# Patient Record
Sex: MALE | Race: WHITE | NOT HISPANIC OR LATINO | Employment: OTHER | URBAN - METROPOLITAN AREA
[De-identification: names, ages, dates, MRNs, and addresses within clinical notes are randomized per-mention and may not be internally consistent; named-entity substitution may affect disease eponyms.]

---

## 2017-03-11 ENCOUNTER — ALLSCRIPTS OFFICE VISIT (OUTPATIENT)
Dept: OTHER | Facility: OTHER | Age: 47
End: 2017-03-11

## 2017-03-22 ENCOUNTER — ALLSCRIPTS OFFICE VISIT (OUTPATIENT)
Dept: OTHER | Facility: OTHER | Age: 47
End: 2017-03-22

## 2017-03-22 DIAGNOSIS — M25.552 PAIN IN LEFT HIP: ICD-10-CM

## 2017-09-26 ENCOUNTER — ALLSCRIPTS OFFICE VISIT (OUTPATIENT)
Dept: OTHER | Facility: OTHER | Age: 47
End: 2017-09-26

## 2017-11-02 ENCOUNTER — GENERIC CONVERSION - ENCOUNTER (OUTPATIENT)
Dept: FAMILY MEDICINE CLINIC | Facility: CLINIC | Age: 47
End: 2017-11-02

## 2017-11-30 ENCOUNTER — GENERIC CONVERSION - ENCOUNTER (OUTPATIENT)
Dept: OTHER | Facility: OTHER | Age: 47
End: 2017-11-30

## 2018-01-10 NOTE — PROGRESS NOTES
Assessment    1  Lateral epicondylitis of right elbow (726 32) (M77 11)    Plan  Anxiety    · LORazepam 0 5 MG Oral Tablet; TAKE 1 TABLET EVERY 6 TO 8 HOURS AS  NEEDED  Lateral epicondylitis of right elbow    · PredniSONE 20 MG Oral Tablet; 1 bid x 5 days , 1 qd x 5 days    Chief Complaint  RIGHT ELBO PAIN--CANT --PAIN INTO FOREARM      History of Present Illness  HPI: see CC      Active Problems    1  Acute otitis media (382 9) (H66 90)   2  Ankle pain (719 47) (M25 579)   3  Anxiety (300 00) (F41 9)   4  Diverticulitis of colon (562 11) (K57 32)   5  Foot pain, unspecified laterality   6  Mixed hyperlipidemia (272 2) (E78 2)   7  Need for influenza vaccination (V04 81) (Z23)   8  Warts (078 10) (B07 9)    Past Medical History  Active Problems And Past Medical History Reviewed: The active problems and past medical history were reviewed and updated today  Family History  Father    1  Family history of diabetes mellitus (V18 0) (Z83 3)   2  Family history of hypertension (V17 49) (Z82 49)    Social History    · Current every day smoker (305 1) (F17 200)    Current Meds   1  LORazepam 0 5 MG Oral Tablet; TAKE 1 TABLET EVERY 6 TO 8 HOURS AS NEEDED; Therapy: [id number] to (Evaluate:14Mar2017); Last Rx:14Nov2016; Status: ACTIVE -   Retrospective By Protocol Authorization Ordered   2  PARoxetine HCl - 20 MG Oral Tablet; TAKE ONE (1) TABLET(S) DAILY; Therapy: [id number] to (Ge Cool)  Requested for: 44NKU7812; Last   Rx:[id number] Ordered    Allergies    1  No Known Drug Allergies    Vitals   Recorded: [id number] 08:46AM   Heart Rate 72   Systolic 161   Diastolic 74   Height 5 ft 3 in   Weight 155 lb    BMI Calculated 27 46   BSA Calculated 1 74   O2 Saturation 96     Physical Exam    Constitutional   General appearance: No acute distress, well appearing and well nourished  Pulmonary   Auscultation of lungs: Clear to auscultation, equal breath sounds bilaterally, no wheezes, no rales, no rhonci  Cardiovascular   Auscultation of heart: Normal rate and rhythm, normal S1 and S2, without murmurs  Musculoskeletal   Inspection/palpation of joints, bones, and muscles: Abnormal   R lat epicondylitis          Signatures   Electronically signed by : Hui Zuleta MD; Mar 11 2017  8:58AM EST                       (Author)

## 2018-01-11 NOTE — RESULT NOTES
Verified Results  (1) LIPID PANEL, FASTING 16Apr2016 09:11AM Radha Jaeger     Test Name Result Flag Reference   CHOLESTEROL, TOTAL 220 mg/dL H 125-200   HDL CHOLESTEROL 53 mg/dL  > OR = 40   TRIGLICERIDES 350 mg/dL H <150   LDL-CHOLESTEROL 126 mg/dL (calc)  <130   Desirable range <100 mg/dL for patients with CHD or  diabetes and <70 mg/dL for diabetic patients with  known heart disease  CHOL/HDLC RATIO 4 2 (calc)  < OR = 5 0   NON HDL CHOLESTEROL 167 mg/dL (calc) H    Target for non-HDL cholesterol is 30 mg/dL higher than   LDL cholesterol target  (1) COMPREHENSIVE METABOLIC PANEL [id number] 12:65CK Radha Jaeger   REPORT COMMENT:  FASTING:YES     Test Name Result Flag Reference   GLUCOSE 94 mg/dL  65-99   Fasting reference interval   UREA NITROGEN (BUN) 19 mg/dL  7-25   CREATININE 0 94 mg/dL  0 60-1 35   eGFR NON-AFR   AMERICAN 97 mL/min/1 73m2  > OR = 60   eGFR AFRICAN AMERICAN 112 mL/min/1 73m2  > OR = 60   BUN/CREATININE RATIO   8-79   NOT APPLICABLE (calc)   SODIUM 140 mmol/L  135-146   POTASSIUM 4 7 mmol/L  3 5-5 3   CHLORIDE 107 mmol/L     CARBON DIOXIDE 23 mmol/L  19-30   CALCIUM 9 5 mg/dL  8 6-10 3   PROTEIN, TOTAL 6 5 g/dL  6 1-8 1   ALBUMIN 4 3 g/dL  3 6-5 1   GLOBULIN 2 2 g/dL (calc)  1 9-3 7   ALBUMIN/GLOBULIN RATIO 2 0 (calc)  1 0-2 5   BILIRUBIN, TOTAL 0 4 mg/dL  0 2-1 2   ALKALINE PHOSPHATASE 48 U/L     AST 28 U/L  10-40   ALT 36 U/L  9-46

## 2018-01-12 VITALS
DIASTOLIC BLOOD PRESSURE: 82 MMHG | BODY MASS INDEX: 27.46 KG/M2 | HEIGHT: 63 IN | SYSTOLIC BLOOD PRESSURE: 126 MMHG | WEIGHT: 155 LBS

## 2018-01-13 VITALS
OXYGEN SATURATION: 95 % | SYSTOLIC BLOOD PRESSURE: 160 MMHG | WEIGHT: 154 LBS | TEMPERATURE: 98 F | HEIGHT: 63 IN | BODY MASS INDEX: 27.29 KG/M2 | HEART RATE: 122 BPM | DIASTOLIC BLOOD PRESSURE: 100 MMHG | RESPIRATION RATE: 18 BRPM

## 2018-01-14 VITALS
SYSTOLIC BLOOD PRESSURE: 120 MMHG | OXYGEN SATURATION: 96 % | HEIGHT: 63 IN | DIASTOLIC BLOOD PRESSURE: 74 MMHG | WEIGHT: 155 LBS | HEART RATE: 72 BPM | BODY MASS INDEX: 27.46 KG/M2

## 2018-03-20 DIAGNOSIS — F41.9 ANXIETY: Primary | ICD-10-CM

## 2018-03-20 RX ORDER — LORAZEPAM 0.5 MG/1
0.5 TABLET ORAL EVERY 6 HOURS PRN
Qty: 30 TABLET | Refills: 0 | Status: SHIPPED | OUTPATIENT
Start: 2018-03-20 | End: 2018-04-14 | Stop reason: SDUPTHER

## 2018-03-20 NOTE — TELEPHONE ENCOUNTER
Please approve    Pt due labs and hm - called and spoke to pt, he will call back to set up ov and labs later on this evening

## 2018-04-03 DIAGNOSIS — Z13.220 NEED FOR LIPID SCREENING: ICD-10-CM

## 2018-04-03 DIAGNOSIS — F41.9 ANXIETY: Primary | ICD-10-CM

## 2018-04-14 DIAGNOSIS — F41.9 ANXIETY: ICD-10-CM

## 2018-04-14 LAB
ALBUMIN SERPL-MCNC: 4.5 G/DL (ref 3.5–5.5)
ALBUMIN/GLOB SERPL: 1.8 {RATIO} (ref 1.2–2.2)
ALP SERPL-CCNC: 63 IU/L (ref 39–117)
ALT SERPL-CCNC: 41 IU/L (ref 0–44)
AST SERPL-CCNC: 32 IU/L (ref 0–40)
BILIRUB SERPL-MCNC: 0.3 MG/DL (ref 0–1.2)
BUN SERPL-MCNC: 17 MG/DL (ref 6–24)
BUN/CREAT SERPL: 18 (ref 9–20)
CALCIUM SERPL-MCNC: 9.3 MG/DL (ref 8.7–10.2)
CHLORIDE SERPL-SCNC: 101 MMOL/L (ref 96–106)
CHOLEST SERPL-MCNC: 207 MG/DL (ref 100–199)
CO2 SERPL-SCNC: 22 MMOL/L (ref 18–29)
CREAT SERPL-MCNC: 0.95 MG/DL (ref 0.76–1.27)
GLOBULIN SER-MCNC: 2.5 G/DL (ref 1.5–4.5)
GLUCOSE SERPL-MCNC: 94 MG/DL (ref 65–99)
HDLC SERPL-MCNC: 44 MG/DL
LDLC SERPL CALC-MCNC: 104 MG/DL (ref 0–99)
LDLC/HDLC SERPL: 2.4 RATIO (ref 0–3.6)
POTASSIUM SERPL-SCNC: 4.7 MMOL/L (ref 3.5–5.2)
PROT SERPL-MCNC: 7 G/DL (ref 6–8.5)
SL AMB EGFR AFRICAN AMERICAN: 109 ML/MIN/1.73
SL AMB EGFR NON AFRICAN AMERICAN: 94 ML/MIN/1.73
SL AMB VLDL CHOLESTEROL CALC: 59 MG/DL (ref 5–40)
SODIUM SERPL-SCNC: 139 MMOL/L (ref 134–144)
TRIGL SERPL-MCNC: 296 MG/DL (ref 0–149)

## 2018-04-14 RX ORDER — LORAZEPAM 0.5 MG/1
0.5 TABLET ORAL EVERY 6 HOURS PRN
Qty: 30 TABLET | Refills: 0 | Status: SHIPPED | OUTPATIENT
Start: 2018-04-14 | End: 2018-04-19 | Stop reason: SDUPTHER

## 2018-04-16 ENCOUNTER — TELEPHONE (OUTPATIENT)
Dept: FAMILY MEDICINE CLINIC | Facility: CLINIC | Age: 48
End: 2018-04-16

## 2018-04-19 ENCOUNTER — OFFICE VISIT (OUTPATIENT)
Dept: FAMILY MEDICINE CLINIC | Facility: CLINIC | Age: 48
End: 2018-04-19
Payer: COMMERCIAL

## 2018-04-19 VITALS
OXYGEN SATURATION: 98 % | BODY MASS INDEX: 27.29 KG/M2 | HEIGHT: 63 IN | SYSTOLIC BLOOD PRESSURE: 138 MMHG | HEART RATE: 88 BPM | DIASTOLIC BLOOD PRESSURE: 82 MMHG | WEIGHT: 154 LBS

## 2018-04-19 DIAGNOSIS — Z00.00 WELLNESS EXAMINATION: Primary | ICD-10-CM

## 2018-04-19 DIAGNOSIS — F41.9 ANXIETY: ICD-10-CM

## 2018-04-19 DIAGNOSIS — E78.01 FAMILIAL HYPERCHOLESTEROLEMIA: ICD-10-CM

## 2018-04-19 PROCEDURE — 3008F BODY MASS INDEX DOCD: CPT | Performed by: FAMILY MEDICINE

## 2018-04-19 PROCEDURE — 99213 OFFICE O/P EST LOW 20 MIN: CPT | Performed by: FAMILY MEDICINE

## 2018-04-19 PROCEDURE — 99396 PREV VISIT EST AGE 40-64: CPT | Performed by: FAMILY MEDICINE

## 2018-04-19 RX ORDER — LORAZEPAM 0.5 MG/1
0.5 TABLET ORAL EVERY 8 HOURS PRN
Qty: 60 TABLET | Refills: 5 | Status: SHIPPED | OUTPATIENT
Start: 2018-04-19 | End: 2018-11-12 | Stop reason: SDUPTHER

## 2018-04-19 RX ORDER — PAROXETINE HYDROCHLORIDE 20 MG/1
1 TABLET, FILM COATED ORAL DAILY
COMMUNITY
Start: 2016-02-08 | End: 2019-05-10

## 2018-04-19 RX ORDER — AZITHROMYCIN 250 MG/1
250 TABLET, FILM COATED ORAL EVERY 24 HOURS
Qty: 6 TABLET | Refills: 0 | Status: SHIPPED | OUTPATIENT
Start: 2018-04-19 | End: 2018-04-24

## 2018-04-19 NOTE — PROGRESS NOTES
HPI:  Sada Plasencia is a 50 y o  male here for his yearly health maintenance exam    There is no problem list on file for this patient  Past Medical History:   Diagnosis Date    Allergic     Anxiety     Depression        1  Advanced Directive: n     2  Durable Power of  for Healthcare: n     3  Social History:           Drug and alcohol History: n                  4  General Health: good              Immunizations up to date: y                 Lifestyle:                           Healthy Jocelyn Cardenas                          Alcohol Use:y                          Tobacco Use:n                          Regular exercise:n                          Weight concerns:n                               5  Over the past 2 weeks, how often have you been bothered by the following:              Little interest or pleasure in doing things:n              Felling down, depressed or hopeless:n       Current Outpatient Prescriptions   Medication Sig Dispense Refill    LORazepam (ATIVAN) 0 5 mg tablet Take 1 tablet (0 5 mg total) by mouth every 8 (eight) hours as needed for anxiety 60 tablet 5    PARoxetine (PAXIL) 20 mg tablet Take 1 tablet by mouth daily       No current facility-administered medications for this visit  No Known Allergies  Immunization History   Administered Date(s) Administered    Influenza Quadrivalent Preservative Free 3 years and older IM 10/21/2015    Tdap 11/07/2011       Patient Care Team:  Vonnie Ross MD as PCP - General      Physical Exam :  Physical Exam     Reviewed Updated [facility] Prior Wellness Visits:   Last Health Maintenance visit information was reviewed, patient interviewed , no change since last HM visit yes  Last HM visit information was reviewed, patient interviewed and updates made to the record today yes    Assessment and Plan:  1  Wellness examination     2  Anxiety  LORazepam (ATIVAN) 0 5 mg tablet   3   Familial hypercholesterolemia         Health Maintenance Due   Topic Date Due    HIV SCREENING  1970    PNEUMOCOCCAL POLYSACCHARIDE VACCINE AGE 2-64 HIGH RISK  01/21/1972    Depression Screening PHQ-9  01/21/1982    [facility]    INFLUENZA VACCINE  09/01/2017

## 2018-04-19 NOTE — PROGRESS NOTES
[address] [facility]        NAME: Martell Vuong is a 50 y o  male  : 1970    MRN: [id number]  DATE: 2018  TIME: 8:37 AM    Assessment and Plan   Wellness examination [Z00 00]  1  Wellness examination     2  Anxiety  LORazepam (ATIVAN) 0 5 mg tablet   3  Familial hypercholesterolemia           Patient Instructions     Patient Instructions   Diet disc for high trigs          Chief Complaint     Chief Complaint   Patient presents with    MM     review labs/medications    Physical Exam     HM due         History of Present Illness       Stable anxiety        Review of Systems   Review of Systems   Constitutional: Negative for appetite change, chills, diaphoresis and fever  HENT: Negative for ear pain, rhinorrhea, sinus pressure and sore throat  Eyes: Negative for discharge, redness and itching  Respiratory: Negative for cough, shortness of breath and wheezing  Cardiovascular: Negative for chest pain and palpitations  Rapid or slow heart rate   Gastrointestinal: Negative for abdominal pain, diarrhea, nausea and vomiting           Current Medications       Current Outpatient Prescriptions:     LORazepam (ATIVAN) 0 5 mg tablet, Take 1 tablet (0 5 mg total) by mouth every 8 (eight) hours as needed for anxiety, Disp: 60 tablet, Rfl: 5    PARoxetine (PAXIL) 20 mg tablet, Take 1 tablet by mouth daily, Disp: , Rfl:     Current Allergies     Allergies as of 2018    (No Known Allergies)            The following portions of the patient's history were reviewed and updated as appropriate: allergies, current medications, past family history, past medical history, past social history, past surgical history and problem list      Past Medical History:   Diagnosis Date    Allergic     Anxiety     Depression        Past Surgical History:   Procedure Laterality Date    FOOT CAPSULE RELEASE W/ PERCUTANEOUS HEEL CORD LENGTHENING, TIBIAL TENDON TRANSFER      HAND SURGERY      HERNIA REPAIR         Family History   Problem Relation Age of Onset    Diabetes Father     Hypertension Father          Medications have been verified  Objective   /82   Pulse 88   Ht 5' 3" (1 6 m)   Wt 69 9 kg (154 lb)   SpO2 98%   BMI 27 28 kg/m²        Physical Exam     Physical Exam   Constitutional: He appears well-developed and well-nourished  No distress  HENT:   Right Ear: Tympanic membrane, external ear and ear canal normal  Tympanic membrane is not injected  Left Ear: Tympanic membrane, external ear and ear canal normal  Tympanic membrane is not injected  Nose: Nose normal    Mouth/Throat: Uvula is midline, oropharynx is clear and moist and mucous membranes are normal    Eyes: Conjunctivae are normal  Pupils are equal, round, and reactive to light  Neck: Normal range of motion  Neck supple  No thyromegaly present  Cardiovascular: Normal rate, regular rhythm and normal heart sounds  No murmur heard  Pulmonary/Chest: Effort normal and breath sounds normal  No respiratory distress  He has no wheezes  Lymphadenopathy:     He has no cervical adenopathy  Skin: He is not diaphoretic

## 2018-11-12 DIAGNOSIS — F41.9 ANXIETY: ICD-10-CM

## 2018-11-13 RX ORDER — LORAZEPAM 0.5 MG/1
0.5 TABLET ORAL EVERY 8 HOURS PRN
Qty: 60 TABLET | Refills: 2 | Status: SHIPPED | OUTPATIENT
Start: 2018-11-13 | End: 2019-02-07 | Stop reason: SDUPTHER

## 2018-11-27 ENCOUNTER — TELEPHONE (OUTPATIENT)
Dept: FAMILY MEDICINE CLINIC | Facility: CLINIC | Age: 48
End: 2018-11-27

## 2018-11-27 NOTE — TELEPHONE ENCOUNTER
Curryx Kaialainarosemarie   [id number]  appt 11/29/18  khpe  DX--R31 1  A63 0  R35 0  Z71 3        Referral #: [id number] Effective: 11/27/2018 Expires: 02/24/2019

## 2019-01-07 ENCOUNTER — OFFICE VISIT (OUTPATIENT)
Dept: FAMILY MEDICINE CLINIC | Facility: CLINIC | Age: 49
End: 2019-01-07
Payer: COMMERCIAL

## 2019-01-07 VITALS
HEIGHT: 64 IN | DIASTOLIC BLOOD PRESSURE: 74 MMHG | BODY MASS INDEX: 25.61 KG/M2 | WEIGHT: 150 LBS | OXYGEN SATURATION: 98 % | HEART RATE: 110 BPM | SYSTOLIC BLOOD PRESSURE: 118 MMHG

## 2019-01-07 DIAGNOSIS — S71.111D LACERATION OF RIGHT THIGH, SUBSEQUENT ENCOUNTER: ICD-10-CM

## 2019-01-07 DIAGNOSIS — Z48.02 VISIT FOR SUTURE REMOVAL: ICD-10-CM

## 2019-01-07 DIAGNOSIS — J20.1 ACUTE BRONCHITIS DUE TO HAEMOPHILUS INFLUENZAE: Primary | ICD-10-CM

## 2019-01-07 PROCEDURE — 99214 OFFICE O/P EST MOD 30 MIN: CPT | Performed by: FAMILY MEDICINE

## 2019-01-07 PROCEDURE — 3008F BODY MASS INDEX DOCD: CPT | Performed by: FAMILY MEDICINE

## 2019-01-07 RX ORDER — AZITHROMYCIN 250 MG/1
TABLET, FILM COATED ORAL
Qty: 6 TABLET | Refills: 0 | Status: SHIPPED | OUTPATIENT
Start: 2019-01-07 | End: 2019-01-11

## 2019-01-07 RX ORDER — ALBUTEROL SULFATE 90 UG/1
2 AEROSOL, METERED RESPIRATORY (INHALATION) EVERY 6 HOURS PRN
Qty: 8.5 G | Refills: 0 | Status: SHIPPED | OUTPATIENT
Start: 2019-01-07 | End: 2020-03-03 | Stop reason: ALTCHOICE

## 2019-01-07 NOTE — PATIENT INSTRUCTIONS
Covered Z-Rylan and given a albuterol inhaler 2 puffs every 4-6 hours help with the wheeziness  You can also use over-the-counter Robitussin DM to help with the cough  Tylenol Advil as needed for fevers or chills  If you're not improving in 3-4 days call the office, may wish to order an x-ray at that time
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
## 2019-01-14 ENCOUNTER — TELEPHONE (OUTPATIENT)
Dept: FAMILY MEDICINE CLINIC | Facility: CLINIC | Age: 49
End: 2019-01-14

## 2019-01-14 DIAGNOSIS — J20.9 ACUTE BRONCHITIS, UNSPECIFIED ORGANISM: Primary | ICD-10-CM

## 2019-01-14 RX ORDER — PREDNISONE 20 MG/1
TABLET ORAL
Qty: 15 TABLET | Refills: 0 | Status: SHIPPED | OUTPATIENT
Start: 2019-01-14 | End: 2019-05-10

## 2019-01-14 RX ORDER — CEPHALEXIN 500 MG/1
500 CAPSULE ORAL EVERY 8 HOURS SCHEDULED
Qty: 30 CAPSULE | Refills: 0 | Status: SHIPPED | OUTPATIENT
Start: 2019-01-14 | End: 2019-01-24

## 2019-01-14 NOTE — TELEPHONE ENCOUNTER
Pt seen last week- c/o not feeling any better- chest congestion, cough- not able to clear the congestion  Finished zpak  Was told to call back if not better      GIANT QT    No allergies

## 2019-01-14 NOTE — TELEPHONE ENCOUNTER
A okay to Rx cephalexin 500 mg 3 times daily #30  Also prednisone 20 mg twice daily x5 then daily x5 days  Appointment if no better after that

## 2019-02-07 DIAGNOSIS — F41.9 ANXIETY: ICD-10-CM

## 2019-02-07 RX ORDER — LORAZEPAM 0.5 MG/1
0.5 TABLET ORAL EVERY 8 HOURS PRN
Qty: 60 TABLET | Refills: 1 | Status: SHIPPED | OUTPATIENT
Start: 2019-02-07 | End: 2019-04-02 | Stop reason: SDUPTHER

## 2019-04-02 DIAGNOSIS — F41.9 ANXIETY: ICD-10-CM

## 2019-04-03 DIAGNOSIS — E78.2 MIXED HYPERLIPIDEMIA: Primary | ICD-10-CM

## 2019-04-03 RX ORDER — LORAZEPAM 0.5 MG/1
0.5 TABLET ORAL EVERY 8 HOURS PRN
Qty: 60 TABLET | Refills: 0 | Status: SHIPPED | OUTPATIENT
Start: 2019-04-03 | End: 2019-04-29 | Stop reason: SDUPTHER

## 2019-04-17 ENCOUNTER — TELEPHONE (OUTPATIENT)
Dept: FAMILY MEDICINE CLINIC | Facility: CLINIC | Age: 49
End: 2019-04-17

## 2019-04-17 LAB
ALBUMIN SERPL-MCNC: 4.3 G/DL (ref 3.5–5.5)
ALBUMIN/GLOB SERPL: 1.9 {RATIO} (ref 1.2–2.2)
ALP SERPL-CCNC: 57 IU/L (ref 39–117)
ALT SERPL-CCNC: 39 IU/L (ref 0–44)
AST SERPL-CCNC: 28 IU/L (ref 0–40)
BILIRUB SERPL-MCNC: 0.2 MG/DL (ref 0–1.2)
BUN SERPL-MCNC: 17 MG/DL (ref 6–24)
BUN/CREAT SERPL: 19 (ref 9–20)
CALCIUM SERPL-MCNC: 9.5 MG/DL (ref 8.7–10.2)
CHLORIDE SERPL-SCNC: 104 MMOL/L (ref 96–106)
CHOLEST SERPL-MCNC: 226 MG/DL (ref 100–199)
CHOLEST/HDLC SERPL: 4.8 RATIO (ref 0–5)
CO2 SERPL-SCNC: 22 MMOL/L (ref 20–29)
CREAT SERPL-MCNC: 0.89 MG/DL (ref 0.76–1.27)
GLOBULIN SER-MCNC: 2.3 G/DL (ref 1.5–4.5)
GLUCOSE SERPL-MCNC: 97 MG/DL (ref 65–99)
HDLC SERPL-MCNC: 47 MG/DL
LDLC SERPL CALC-MCNC: 128 MG/DL (ref 0–99)
LDLC SERPL DIRECT ASSAY-MCNC: 124 MG/DL (ref 0–99)
POTASSIUM SERPL-SCNC: 4.4 MMOL/L (ref 3.5–5.2)
PROT SERPL-MCNC: 6.6 G/DL (ref 6–8.5)
SL AMB EGFR AFRICAN AMERICAN: 116 ML/MIN/1.73
SL AMB EGFR NON AFRICAN AMERICAN: 100 ML/MIN/1.73
SL AMB VLDL CHOLESTEROL CALC: 51 MG/DL (ref 5–40)
SODIUM SERPL-SCNC: 140 MMOL/L (ref 134–144)
TRIGL SERPL-MCNC: 255 MG/DL (ref 0–149)

## 2019-04-29 DIAGNOSIS — F41.9 ANXIETY: ICD-10-CM

## 2019-04-30 RX ORDER — LORAZEPAM 0.5 MG/1
0.5 TABLET ORAL EVERY 8 HOURS PRN
Qty: 60 TABLET | Refills: 0 | Status: SHIPPED | OUTPATIENT
Start: 2019-04-30 | End: 2019-05-10

## 2019-05-10 ENCOUNTER — OFFICE VISIT (OUTPATIENT)
Dept: FAMILY MEDICINE CLINIC | Facility: CLINIC | Age: 49
End: 2019-05-10
Payer: COMMERCIAL

## 2019-05-10 VITALS
HEART RATE: 66 BPM | HEIGHT: 64 IN | WEIGHT: 160 LBS | BODY MASS INDEX: 27.31 KG/M2 | SYSTOLIC BLOOD PRESSURE: 148 MMHG | DIASTOLIC BLOOD PRESSURE: 108 MMHG | OXYGEN SATURATION: 98 %

## 2019-05-10 DIAGNOSIS — F41.9 ANXIETY: ICD-10-CM

## 2019-05-10 DIAGNOSIS — Z00.00 WELLNESS EXAMINATION: Primary | ICD-10-CM

## 2019-05-10 DIAGNOSIS — E78.01 FAMILIAL HYPERCHOLESTEROLEMIA: ICD-10-CM

## 2019-05-10 PROCEDURE — 99396 PREV VISIT EST AGE 40-64: CPT | Performed by: FAMILY MEDICINE

## 2019-05-10 PROCEDURE — 3008F BODY MASS INDEX DOCD: CPT | Performed by: FAMILY MEDICINE

## 2019-05-10 PROCEDURE — 99214 OFFICE O/P EST MOD 30 MIN: CPT | Performed by: FAMILY MEDICINE

## 2019-05-10 RX ORDER — ATORVASTATIN CALCIUM 10 MG/1
10 TABLET, FILM COATED ORAL DAILY
Qty: 90 TABLET | Refills: 3 | Status: SHIPPED | OUTPATIENT
Start: 2019-05-10 | End: 2020-03-03 | Stop reason: ALTCHOICE

## 2019-05-10 RX ORDER — LORAZEPAM 1 MG/1
1 TABLET ORAL EVERY 8 HOURS PRN
Qty: 90 TABLET | Refills: 5 | Status: SHIPPED | OUTPATIENT
Start: 2019-05-10 | End: 2019-12-21 | Stop reason: SDUPTHER

## 2019-08-21 ENCOUNTER — OFFICE VISIT (OUTPATIENT)
Dept: FAMILY MEDICINE CLINIC | Facility: CLINIC | Age: 49
End: 2019-08-21
Payer: COMMERCIAL

## 2019-08-21 VITALS
BODY MASS INDEX: 24.75 KG/M2 | OXYGEN SATURATION: 98 % | HEIGHT: 64 IN | HEART RATE: 93 BPM | WEIGHT: 145 LBS | SYSTOLIC BLOOD PRESSURE: 134 MMHG | TEMPERATURE: 98.7 F | DIASTOLIC BLOOD PRESSURE: 88 MMHG

## 2019-08-21 DIAGNOSIS — F41.9 ANXIETY: Primary | ICD-10-CM

## 2019-08-21 PROCEDURE — 99213 OFFICE O/P EST LOW 20 MIN: CPT | Performed by: FAMILY MEDICINE

## 2019-08-21 PROCEDURE — 3008F BODY MASS INDEX DOCD: CPT | Performed by: FAMILY MEDICINE

## 2019-08-21 RX ORDER — BUPROPION HYDROCHLORIDE 150 MG/1
150 TABLET, EXTENDED RELEASE ORAL 2 TIMES DAILY
Qty: 60 TABLET | Refills: 3 | Status: SHIPPED | OUTPATIENT
Start: 2019-08-21 | End: 2019-12-23 | Stop reason: SDUPTHER

## 2019-08-21 NOTE — PROGRESS NOTES
[facility]        NAME: Robert Blanton is a 52 y o  male  : 1970    MRN: [id number]  DATE: 2019  TIME: 5:26 PM    Assessment and Plan   Anxiety [F41 9]  1  Anxiety           Patient Instructions     Patient Instructions   Trial wellbutrin---? lexapro in future          Chief Complaint     Chief Complaint   Patient presents with    Change in Bowel Habits     going up to 12 times a day          History of Present Illness       Diarrhea--started shortly after starting zoloft      Review of Systems   Review of Systems   Constitutional: Negative for fatigue, fever and unexpected weight change  HENT: Negative for congestion, sinus pain and sore throat  Eyes: Negative for visual disturbance  Respiratory: Negative for shortness of breath and wheezing  Cardiovascular: Negative for chest pain and palpitations  Gastrointestinal: Positive for diarrhea  Negative for abdominal pain, nausea and vomiting  Musculoskeletal: Negative  Negative for arthralgias and myalgias  Neurological: Negative for syncope, weakness and numbness  Psychiatric/Behavioral: Negative  Negative for confusion, dysphoric mood and suicidal ideas           Current Medications       Current Outpatient Medications:     albuterol (PROAIR HFA) 90 mcg/act inhaler, Inhale 2 puffs every 6 (six) hours as needed for wheezing, Disp: 8 5 g, Rfl: 0    atorvastatin (LIPITOR) 10 mg tablet, Take 1 tablet (10 mg total) by mouth daily, Disp: 90 tablet, Rfl: 3    LORazepam (ATIVAN) 1 mg tablet, Take 1 tablet (1 mg total) by mouth every 8 (eight) hours as needed for anxiety, Disp: 90 tablet, Rfl: 5    Current Allergies     Allergies as of 2019    (No Known Allergies)            The following portions of the patient's history were reviewed and updated as appropriate: allergies, current medications, past family history, past medical history, past social history, past surgical history and problem list  Past Medical History:   Diagnosis Date    Allergic     Anxiety     Depression        Past Surgical History:   Procedure Laterality Date    FOOT CAPSULE RELEASE W/ PERCUTANEOUS HEEL CORD LENGTHENING, TIBIAL TENDON TRANSFER      HAND SURGERY      HERNIA REPAIR         Family History   Problem Relation Age of Onset    Diabetes Father     Hypertension Father          Medications have been verified  Objective   /88   Pulse 93   Temp 98 7 °F (37 1 °C)   Ht 5' 4" (1 626 m)   Wt 65 8 kg (145 lb)   SpO2 98%   BMI 24 89 kg/m²        Physical Exam     Physical Exam   Constitutional: He is oriented to person, place, and time  Vital signs are normal  He appears well-developed and well-nourished  HENT:   Right Ear: Ear canal normal  Tympanic membrane is not injected  Left Ear: Ear canal normal  Tympanic membrane is not injected  Nose: Nose normal    Mouth/Throat: Oropharynx is clear and moist    Eyes: Pupils are equal, round, and reactive to light  Conjunctivae and EOM are normal  Right eye exhibits no discharge  Left eye exhibits no discharge  Neck: Normal range of motion  Neck supple  No thyromegaly present  Cardiovascular: Normal rate, regular rhythm and normal heart sounds  No murmur heard  Pulmonary/Chest: Effort normal and breath sounds normal  No respiratory distress  He has no wheezes  Abdominal: Soft  Bowel sounds are normal  He exhibits no distension  There is no tenderness  Musculoskeletal: Normal range of motion  Lymphadenopathy:     He has no cervical adenopathy  Neurological: He is alert and oriented to person, place, and time  He has normal strength and normal reflexes  He is not disoriented  No sensory deficit  Gait normal    Skin: Skin is warm and dry  Psychiatric: He has a normal mood and affect   His speech is normal and behavior is normal  Judgment and thought content normal  Cognition and memory are normal

## 2019-09-05 ENCOUNTER — OFFICE VISIT (OUTPATIENT)
Dept: FAMILY MEDICINE CLINIC | Facility: CLINIC | Age: 49
End: 2019-09-05
Payer: COMMERCIAL

## 2019-09-05 VITALS
OXYGEN SATURATION: 98 % | HEART RATE: 90 BPM | DIASTOLIC BLOOD PRESSURE: 86 MMHG | SYSTOLIC BLOOD PRESSURE: 136 MMHG | HEIGHT: 64 IN | BODY MASS INDEX: 24.75 KG/M2 | WEIGHT: 145 LBS

## 2019-09-05 DIAGNOSIS — R10.32 LEFT LOWER QUADRANT PAIN: Primary | ICD-10-CM

## 2019-09-05 DIAGNOSIS — K57.92 DIVERTICULITIS: ICD-10-CM

## 2019-09-05 PROCEDURE — 99214 OFFICE O/P EST MOD 30 MIN: CPT | Performed by: FAMILY MEDICINE

## 2019-09-05 PROCEDURE — 3008F BODY MASS INDEX DOCD: CPT | Performed by: FAMILY MEDICINE

## 2019-09-05 RX ORDER — AMOXICILLIN AND CLAVULANATE POTASSIUM 875; 125 MG/1; MG/1
1 TABLET, FILM COATED ORAL EVERY 12 HOURS SCHEDULED
Qty: 20 TABLET | Refills: 0 | Status: SHIPPED | OUTPATIENT
Start: 2019-09-05 | End: 2019-09-15

## 2019-09-05 NOTE — PROGRESS NOTES
[facility]        NAME: Sheila Tavera is a 52 y o  male  : 1970    MRN: [id number]  DATE: 2019  TIME: 8:33 AM    Assessment and Plan   Left lower quadrant pain [R10 32]  1  Left lower quadrant pain           Patient Instructions     There are no Patient Instructions on file for this visit  Chief Complaint     Chief Complaint   Patient presents with    Abdominal Pain     lower         History of Present Illness       C/o LLQ pain for 2 days--no resp to OTC meds--mod severity      Review of Systems   Review of Systems   Constitutional: Negative for fever  Gastrointestinal: Positive for abdominal pain and nausea           Current Medications       Current Outpatient Medications:     albuterol (PROAIR HFA) 90 mcg/act inhaler, Inhale 2 puffs every 6 (six) hours as needed for wheezing, Disp: 8 5 g, Rfl: 0    atorvastatin (LIPITOR) 10 mg tablet, Take 1 tablet (10 mg total) by mouth daily, Disp: 90 tablet, Rfl: 3    buPROPion (WELLBUTRIN SR) 150 mg 12 hr tablet, Take 1 tablet (150 mg total) by mouth 2 (two) times a day, Disp: 60 tablet, Rfl: 3    LORazepam (ATIVAN) 1 mg tablet, Take 1 tablet (1 mg total) by mouth every 8 (eight) hours as needed for anxiety, Disp: 90 tablet, Rfl: 5    Current Allergies     Allergies as of 2019    (No Known Allergies)            The following portions of the patient's history were reviewed and updated as appropriate: allergies, current medications, past family history, past medical history, past social history, past surgical history and problem list      Past Medical History:   Diagnosis Date    Allergic     Anxiety     Depression        Past Surgical History:   Procedure Laterality Date    FOOT CAPSULE RELEASE W/ PERCUTANEOUS HEEL CORD LENGTHENING, TIBIAL TENDON TRANSFER      HAND SURGERY      HERNIA REPAIR         Family History   Problem Relation Age of Onset    Diabetes Father     Hypertension Father Medications have been verified  Objective   /86   Pulse 90   Ht 5' 4" (1 626 m)   Wt 65 8 kg (145 lb)   SpO2 98%   BMI 24 89 kg/m²        Physical Exam     Physical Exam   Constitutional: He is oriented to person, place, and time  Vital signs are normal  He appears well-developed and well-nourished  HENT:   Right Ear: Ear canal normal  Tympanic membrane is not injected  Left Ear: Ear canal normal  Tympanic membrane is not injected  Nose: Nose normal    Mouth/Throat: Oropharynx is clear and moist    Eyes: Pupils are equal, round, and reactive to light  Conjunctivae and EOM are normal  Right eye exhibits no discharge  Left eye exhibits no discharge  Neck: Normal range of motion  Neck supple  No thyromegaly present  Cardiovascular: Normal rate, regular rhythm and normal heart sounds  No murmur heard  Pulmonary/Chest: Effort normal and breath sounds normal  No respiratory distress  He has no wheezes  Abdominal: Soft  Bowel sounds are normal  He exhibits no distension  There is tenderness in the left lower quadrant  Musculoskeletal: Normal range of motion  Lymphadenopathy:     He has no cervical adenopathy  Neurological: He is alert and oriented to person, place, and time  He has normal strength and normal reflexes  He is not disoriented  No sensory deficit  Gait normal    Skin: Skin is warm and dry  Psychiatric: He has a normal mood and affect   His speech is normal and behavior is normal  Judgment and thought content normal  Cognition and memory are normal    tender LLQ to light palpation

## 2019-12-21 DIAGNOSIS — F41.9 ANXIETY: ICD-10-CM

## 2019-12-22 RX ORDER — LORAZEPAM 1 MG/1
1 TABLET ORAL EVERY 8 HOURS PRN
Qty: 90 TABLET | Refills: 4 | Status: SHIPPED | OUTPATIENT
Start: 2019-12-22 | End: 2020-05-22 | Stop reason: SDUPTHER

## 2019-12-23 DIAGNOSIS — F41.9 ANXIETY: ICD-10-CM

## 2019-12-23 RX ORDER — BUPROPION HYDROCHLORIDE 150 MG/1
TABLET, EXTENDED RELEASE ORAL
Qty: 60 TABLET | Refills: 3 | Status: SHIPPED | OUTPATIENT
Start: 2019-12-23 | End: 2020-05-22 | Stop reason: SDUPTHER

## 2020-02-06 ENCOUNTER — OFFICE VISIT (OUTPATIENT)
Dept: FAMILY MEDICINE CLINIC | Facility: CLINIC | Age: 50
End: 2020-02-06
Payer: COMMERCIAL

## 2020-02-06 VITALS
DIASTOLIC BLOOD PRESSURE: 88 MMHG | SYSTOLIC BLOOD PRESSURE: 136 MMHG | WEIGHT: 151 LBS | OXYGEN SATURATION: 98 % | HEIGHT: 64 IN | HEART RATE: 86 BPM | BODY MASS INDEX: 25.78 KG/M2

## 2020-02-06 DIAGNOSIS — Z12.12 ENCOUNTER FOR SCREENING FOR MALIGNANT NEOPLASM OF RECTUM: ICD-10-CM

## 2020-02-06 DIAGNOSIS — E78.01 FAMILIAL HYPERCHOLESTEROLEMIA: ICD-10-CM

## 2020-02-06 DIAGNOSIS — F10.20 ALCOHOLIC (HCC): Primary | ICD-10-CM

## 2020-02-06 DIAGNOSIS — Z12.11 ENCOUNTER FOR SCREENING FOR MALIGNANT NEOPLASM OF COLON: Primary | ICD-10-CM

## 2020-02-06 DIAGNOSIS — Z13.9 SCREENING FOR CONDITION: ICD-10-CM

## 2020-02-06 PROCEDURE — 99214 OFFICE O/P EST MOD 30 MIN: CPT | Performed by: FAMILY MEDICINE

## 2020-02-06 PROCEDURE — 3008F BODY MASS INDEX DOCD: CPT | Performed by: FAMILY MEDICINE

## 2020-02-06 NOTE — PROGRESS NOTES
[facility] Medical        NAME: Alfred Manzanares is a 48 y o  male  : 1970    MRN: [id number]  DATE: 2020  TIME: 10:14 AM    Assessment and Plan   Alcoholic ([facility] [address] ) [R34 05]  1  Alcoholic (HCC)  Gamma GT    Gamma GT   2  Familial hypercholesterolemia  Comprehensive metabolic panel    Lipid Panel with Direct LDL reflex    Gamma GT    Comprehensive metabolic panel    Lipid Panel with Direct LDL reflex    Gamma GT   3  Screening for condition  PSA Total (Reflex To Free)    PSA Total (Reflex To Free)         Patient Instructions     Patient Instructions   Pt good candidate for out-pt alcohol program          Chief Complaint     Chief Complaint   Patient presents with    Alcohol Problem     Outpt  rehab         History of Present Illness       Pt wants to be evaluated for treatment for alcoholism--daily intake 6-15 beers daily      Review of Systems   Review of Systems   Constitutional: Negative for fatigue, fever and unexpected weight change  HENT: Negative for congestion, sinus pain and sore throat  Eyes: Negative for visual disturbance  Respiratory: Negative for shortness of breath and wheezing  Cardiovascular: Negative for chest pain and palpitations  Gastrointestinal: Negative for abdominal pain, nausea and vomiting  Musculoskeletal: Negative  Negative for arthralgias and myalgias  Neurological: Negative for syncope, weakness and numbness  Psychiatric/Behavioral: Negative  Negative for confusion, dysphoric mood and suicidal ideas           Current Medications       Current Outpatient Medications:     buPROPion (WELLBUTRIN SR) 150 mg 12 hr tablet, TAKE ONE TABLET BY MOUTH TWICE A DAY, Disp: 60 tablet, Rfl: 3    LORazepam (ATIVAN) 1 mg tablet, Take 1 tablet (1 mg total) by mouth every 8 (eight) hours as needed for anxiety, Disp: 90 tablet, Rfl: 4    albuterol (PROAIR HFA) 90 mcg/act inhaler, Inhale 2 puffs every 6 (six) hours as needed for wheezing (Patient not taking: Reported on 2/6/2020), Disp: 8 5 g, Rfl: 0    atorvastatin (LIPITOR) 10 mg tablet, Take 1 tablet (10 mg total) by mouth daily (Patient not taking: Reported on 2/6/2020), Disp: 90 tablet, Rfl: 3    Current Allergies     Allergies as of 02/06/2020    (No Known Allergies)            The following portions of the patient's history were reviewed and updated as appropriate: allergies, current medications, past family history, past medical history, past social history, past surgical history and problem list      Past Medical History:   Diagnosis Date    Allergic     Anxiety     Depression        Past Surgical History:   Procedure Laterality Date    FOOT CAPSULE RELEASE W/ PERCUTANEOUS HEEL CORD LENGTHENING, TIBIAL TENDON TRANSFER      HAND SURGERY      HERNIA REPAIR         Family History   Problem Relation Age of Onset    Diabetes Father     Hypertension Father          Medications have been verified  Objective   /88   Pulse 86   Ht 5' 4" (1 626 m)   Wt 68 5 kg (151 lb)   SpO2 98%   BMI 25 92 kg/m²        Physical Exam     Physical Exam   Constitutional: He is oriented to person, place, and time  Vital signs are normal  He appears well-developed and well-nourished  HENT:   Right Ear: Ear canal normal  Tympanic membrane is not injected  Left Ear: Ear canal normal  Tympanic membrane is not injected  Nose: Nose normal    Mouth/Throat: Oropharynx is clear and moist    Eyes: Pupils are equal, round, and reactive to light  Conjunctivae and EOM are normal  Right eye exhibits no discharge  Left eye exhibits no discharge  Neck: Normal range of motion  Neck supple  No thyromegaly present  Cardiovascular: Normal rate, regular rhythm and normal heart sounds  No murmur heard  Pulmonary/Chest: Effort normal and breath sounds normal  No respiratory distress  He has no wheezes  Abdominal: Soft  Bowel sounds are normal  He exhibits no distension  There is no tenderness     Musculoskeletal: Normal range of motion  Lymphadenopathy:     He has no cervical adenopathy  Neurological: He is alert and oriented to person, place, and time  He has normal strength and normal reflexes  He is not disoriented  No sensory deficit  Gait normal    Skin: Skin is warm and dry  Psychiatric: He has a normal mood and affect  His speech is normal and behavior is normal  Judgment and thought content normal  Cognition and memory are normal      BMI Counseling: Body mass index is 25 92 kg/m²  The BMI is above normal  Nutrition recommendations include reducing portion sizes

## 2020-02-23 LAB
ALBUMIN SERPL-MCNC: 4.3 G/DL (ref 4–5)
ALBUMIN/GLOB SERPL: 1.7 {RATIO} (ref 1.2–2.2)
ALP SERPL-CCNC: 57 IU/L (ref 39–117)
ALT SERPL-CCNC: 50 IU/L (ref 0–44)
AST SERPL-CCNC: 31 IU/L (ref 0–40)
BILIRUB SERPL-MCNC: 0.3 MG/DL (ref 0–1.2)
BUN SERPL-MCNC: 15 MG/DL (ref 6–24)
BUN/CREAT SERPL: 14 (ref 9–20)
CALCIUM SERPL-MCNC: 9.3 MG/DL (ref 8.7–10.2)
CHLORIDE SERPL-SCNC: 103 MMOL/L (ref 96–106)
CHOLEST SERPL-MCNC: 245 MG/DL (ref 100–199)
CO2 SERPL-SCNC: 19 MMOL/L (ref 20–29)
CREAT SERPL-MCNC: 1.06 MG/DL (ref 0.76–1.27)
GGT SERPL-CCNC: 67 IU/L (ref 0–65)
GLOBULIN SER-MCNC: 2.6 G/DL (ref 1.5–4.5)
GLUCOSE SERPL-MCNC: 102 MG/DL (ref 65–99)
HDLC SERPL-MCNC: 37 MG/DL
LDLC SERPL CALC-MCNC: 139 MG/DL (ref 0–99)
LDLC/HDLC SERPL: 3.8 RATIO (ref 0–3.6)
POTASSIUM SERPL-SCNC: 4.4 MMOL/L (ref 3.5–5.2)
PROT SERPL-MCNC: 6.9 G/DL (ref 6–8.5)
PSA SERPL-MCNC: 1.4 NG/ML (ref 0–4)
SL AMB EGFR AFRICAN AMERICAN: 94 ML/MIN/1.73
SL AMB EGFR NON AFRICAN AMERICAN: 81 ML/MIN/1.73
SL AMB REFLEX CRITERIA: NORMAL
SL AMB VLDL CHOLESTEROL CALC: 69 MG/DL (ref 5–40)
SODIUM SERPL-SCNC: 139 MMOL/L (ref 134–144)
TRIGL SERPL-MCNC: 347 MG/DL (ref 0–149)

## 2020-02-27 ENCOUNTER — TELEPHONE (OUTPATIENT)
Dept: FAMILY MEDICINE CLINIC | Facility: CLINIC | Age: 50
End: 2020-02-27

## 2020-02-27 NOTE — TELEPHONE ENCOUNTER
----- Message from Willetta Spatz, MD sent at 2/24/2020  6:32 AM EST -----  MM/LABS 12MOS---LFTs sl elevated---stop alcohol

## 2020-03-03 ENCOUNTER — OFFICE VISIT (OUTPATIENT)
Dept: FAMILY MEDICINE CLINIC | Facility: CLINIC | Age: 50
End: 2020-03-03
Payer: COMMERCIAL

## 2020-03-03 VITALS
DIASTOLIC BLOOD PRESSURE: 88 MMHG | HEART RATE: 87 BPM | WEIGHT: 161.6 LBS | SYSTOLIC BLOOD PRESSURE: 138 MMHG | TEMPERATURE: 98.7 F | HEIGHT: 64 IN | BODY MASS INDEX: 27.59 KG/M2 | OXYGEN SATURATION: 97 % | RESPIRATION RATE: 18 BRPM

## 2020-03-03 DIAGNOSIS — F10.10 ALCOHOL ABUSE: ICD-10-CM

## 2020-03-03 DIAGNOSIS — F41.9 ANXIETY: Primary | ICD-10-CM

## 2020-03-03 PROCEDURE — 99214 OFFICE O/P EST MOD 30 MIN: CPT | Performed by: FAMILY MEDICINE

## 2020-03-03 PROCEDURE — 3008F BODY MASS INDEX DOCD: CPT | Performed by: FAMILY MEDICINE

## 2020-03-03 NOTE — PROGRESS NOTES
[facility] Medical        NAME: Ulla Kayser is a 48 y o  male  : 1970    MRN: [id number]  DATE: March 3, 2020  TIME: 7:36 AM    Assessment and Plan   Anxiety [F41 9]  1  Anxiety     2  Alcohol abuse           Patient Instructions     Patient Instructions   No alcohol for 30 days--incr anxiety---disc med marij--good candidate--refer --25 min disc          Chief Complaint     Chief Complaint   Patient presents with    Follow-up     to chronic conditions- discuss blood work from 20         History of Present Illness       See plan--pt does not want group therapy as Mark suggested--very private person      Review of Systems   Review of Systems   Constitutional: Negative for fatigue, fever and unexpected weight change  HENT: Negative for congestion, sinus pain and sore throat  Eyes: Negative for visual disturbance  Respiratory: Negative for shortness of breath and wheezing  Cardiovascular: Negative for chest pain and palpitations  Gastrointestinal: Negative for abdominal pain, nausea and vomiting  Musculoskeletal: Negative  Negative for arthralgias and myalgias  Neurological: Negative for syncope, weakness and numbness  Psychiatric/Behavioral: Negative for confusion, dysphoric mood and suicidal ideas  The patient is nervous/anxious            Current Medications       Current Outpatient Medications:     buPROPion (WELLBUTRIN SR) 150 mg 12 hr tablet, TAKE ONE TABLET BY MOUTH TWICE A DAY, Disp: 60 tablet, Rfl: 3    LORazepam (ATIVAN) 1 mg tablet, Take 1 tablet (1 mg total) by mouth every 8 (eight) hours as needed for anxiety, Disp: 90 tablet, Rfl: 4    Current Allergies     Allergies as of 2020    (No Known Allergies)            The following portions of the patient's history were reviewed and updated as appropriate: allergies, current medications, past family history, past medical history, past social history, past surgical history and problem list  Past Medical History:   Diagnosis Date    Allergic     Anxiety     Depression        Past Surgical History:   Procedure Laterality Date    FOOT CAPSULE RELEASE W/ PERCUTANEOUS HEEL CORD LENGTHENING, TIBIAL TENDON TRANSFER      HAND SURGERY      HERNIA REPAIR         Family History   Problem Relation Age of Onset    Diabetes Father     Hypertension Father          Medications have been verified  Objective   /88   Pulse 87   Temp 98 7 °F (37 1 °C) (Tympanic)   Resp 18   Ht 5' 4" (1 626 m)   Wt 73 3 kg (161 lb 9 6 oz)   SpO2 97%   BMI 27 74 kg/m²        Physical Exam     Physical Exam   Constitutional: He is oriented to person, place, and time  Vital signs are normal  He appears well-developed and well-nourished  HENT:   Right Ear: Ear canal normal  Tympanic membrane is not injected  Left Ear: Ear canal normal  Tympanic membrane is not injected  Nose: Nose normal    Mouth/Throat: Oropharynx is clear and moist    Eyes: Pupils are equal, round, and reactive to light  Conjunctivae and EOM are normal  Right eye exhibits no discharge  Left eye exhibits no discharge  Neck: Normal range of motion  Neck supple  No thyromegaly present  Cardiovascular: Normal rate, regular rhythm and normal heart sounds  No murmur heard  Pulmonary/Chest: Effort normal and breath sounds normal  No respiratory distress  He has no wheezes  Abdominal: Soft  Bowel sounds are normal  He exhibits no distension  There is no tenderness  Musculoskeletal: Normal range of motion  Lymphadenopathy:     He has no cervical adenopathy  Neurological: He is alert and oriented to person, place, and time  He has normal strength and normal reflexes  He is not disoriented  No sensory deficit  Gait normal    Skin: Skin is warm and dry  Psychiatric: He has a normal mood and affect   His speech is normal and behavior is normal  Judgment and thought content normal  Cognition and memory are normal      BMI Counseling: Body mass index is 27 74 kg/m²  The BMI is above normal  Nutrition recommendations include reducing portion sizes

## 2020-04-01 ENCOUNTER — TELEPHONE (OUTPATIENT)
Dept: BEHAVIORAL/MENTAL HEALTH CLINIC | Facility: CLINIC | Age: 50
End: 2020-04-01

## 2020-05-22 DIAGNOSIS — F41.9 ANXIETY: ICD-10-CM

## 2020-05-23 RX ORDER — LORAZEPAM 1 MG/1
1 TABLET ORAL EVERY 8 HOURS PRN
Qty: 90 TABLET | Refills: 4 | Status: SHIPPED | OUTPATIENT
Start: 2020-05-23 | End: 2020-11-06 | Stop reason: SDUPTHER

## 2020-05-23 RX ORDER — BUPROPION HYDROCHLORIDE 150 MG/1
150 TABLET, EXTENDED RELEASE ORAL 2 TIMES DAILY
Qty: 60 TABLET | Refills: 3 | Status: SHIPPED | OUTPATIENT
Start: 2020-05-23 | End: 2020-10-06 | Stop reason: SDUPTHER

## 2020-10-06 DIAGNOSIS — F41.9 ANXIETY: ICD-10-CM

## 2020-10-06 RX ORDER — BUPROPION HYDROCHLORIDE 150 MG/1
150 TABLET, EXTENDED RELEASE ORAL 2 TIMES DAILY
Qty: 60 TABLET | Refills: 2 | Status: SHIPPED | OUTPATIENT
Start: 2020-10-06 | End: 2020-11-06 | Stop reason: SDUPTHER

## 2020-11-06 ENCOUNTER — OFFICE VISIT (OUTPATIENT)
Dept: FAMILY MEDICINE CLINIC | Facility: CLINIC | Age: 50
End: 2020-11-06
Payer: COMMERCIAL

## 2020-11-06 VITALS
HEART RATE: 94 BPM | SYSTOLIC BLOOD PRESSURE: 136 MMHG | WEIGHT: 166 LBS | RESPIRATION RATE: 20 BRPM | BODY MASS INDEX: 28.34 KG/M2 | HEIGHT: 64 IN | OXYGEN SATURATION: 95 % | DIASTOLIC BLOOD PRESSURE: 94 MMHG | TEMPERATURE: 97.6 F

## 2020-11-06 DIAGNOSIS — F41.9 ANXIETY: ICD-10-CM

## 2020-11-06 DIAGNOSIS — R03.0 ELEVATED BLOOD PRESSURE READING WITHOUT DIAGNOSIS OF HYPERTENSION: ICD-10-CM

## 2020-11-06 DIAGNOSIS — M25.552 HIP PAIN, ACUTE, LEFT: Primary | ICD-10-CM

## 2020-11-06 PROCEDURE — 99214 OFFICE O/P EST MOD 30 MIN: CPT | Performed by: FAMILY MEDICINE

## 2020-11-06 RX ORDER — LISINOPRIL 20 MG/1
20 TABLET ORAL DAILY
Qty: 30 TABLET | Refills: 1 | Status: SHIPPED | OUTPATIENT
Start: 2020-11-06 | End: 2020-12-07 | Stop reason: SDUPTHER

## 2020-11-06 RX ORDER — BUPROPION HYDROCHLORIDE 150 MG/1
150 TABLET, EXTENDED RELEASE ORAL 2 TIMES DAILY
Qty: 60 TABLET | Refills: 5 | Status: SHIPPED | OUTPATIENT
Start: 2020-11-06 | End: 2021-06-01 | Stop reason: SDUPTHER

## 2020-11-06 RX ORDER — NAPROXEN 500 MG/1
500 TABLET ORAL 2 TIMES DAILY WITH MEALS
Qty: 30 TABLET | Refills: 5 | Status: SHIPPED | OUTPATIENT
Start: 2020-11-06 | End: 2021-02-19

## 2020-11-06 RX ORDER — LORAZEPAM 1 MG/1
1 TABLET ORAL EVERY 8 HOURS PRN
Qty: 90 TABLET | Refills: 1 | Status: SHIPPED | OUTPATIENT
Start: 2020-11-06 | End: 2021-01-18 | Stop reason: SDUPTHER

## 2020-11-09 ENCOUNTER — HOSPITAL ENCOUNTER (OUTPATIENT)
Dept: RADIOLOGY | Facility: HOSPITAL | Age: 50
Discharge: HOME | End: 2020-11-09
Payer: COMMERCIAL

## 2020-11-09 DIAGNOSIS — M25.552 HIP PAIN, ACUTE, LEFT: ICD-10-CM

## 2020-11-09 PROCEDURE — 73502 X-RAY EXAM HIP UNI 2-3 VIEWS: CPT

## 2020-11-10 ENCOUNTER — TELEPHONE (OUTPATIENT)
Dept: FAMILY MEDICINE CLINIC | Facility: CLINIC | Age: 50
End: 2020-11-10

## 2020-11-10 DIAGNOSIS — M25.552 HIP PAIN, ACUTE, LEFT: Primary | ICD-10-CM

## 2020-11-12 ENCOUNTER — OFFICE VISIT (OUTPATIENT)
Dept: OBGYN CLINIC | Facility: CLINIC | Age: 50
End: 2020-11-12
Payer: COMMERCIAL

## 2020-11-12 VITALS
HEIGHT: 64 IN | DIASTOLIC BLOOD PRESSURE: 70 MMHG | TEMPERATURE: 98.1 F | BODY MASS INDEX: 28.34 KG/M2 | SYSTOLIC BLOOD PRESSURE: 130 MMHG | WEIGHT: 166 LBS

## 2020-11-12 DIAGNOSIS — M16.12 PRIMARY OSTEOARTHRITIS OF LEFT HIP: ICD-10-CM

## 2020-11-12 DIAGNOSIS — M25.852 FEMOROACETABULAR IMPINGEMENT OF LEFT HIP: Primary | ICD-10-CM

## 2020-11-12 PROCEDURE — 99243 OFF/OP CNSLTJ NEW/EST LOW 30: CPT | Performed by: ORTHOPAEDIC SURGERY

## 2020-11-14 ENCOUNTER — NURSE TRIAGE (OUTPATIENT)
Dept: OTHER | Facility: OTHER | Age: 50
End: 2020-11-14

## 2020-11-14 DIAGNOSIS — R52 PAIN: Primary | ICD-10-CM

## 2020-11-14 RX ORDER — HYDROCODONE BITARTRATE AND ACETAMINOPHEN 5; 325 MG/1; MG/1
1 TABLET ORAL EVERY 6 HOURS PRN
Qty: 30 TABLET | Refills: 0 | Status: SHIPPED | OUTPATIENT
Start: 2020-11-14 | End: 2021-02-19

## 2020-11-17 ENCOUNTER — TELEPHONE (OUTPATIENT)
Dept: FAMILY MEDICINE CLINIC | Facility: CLINIC | Age: 50
End: 2020-11-17

## 2020-11-25 ENCOUNTER — CONSULT (OUTPATIENT)
Dept: PAIN MEDICINE | Facility: CLINIC | Age: 50
End: 2020-11-25
Payer: COMMERCIAL

## 2020-11-25 VITALS
DIASTOLIC BLOOD PRESSURE: 80 MMHG | SYSTOLIC BLOOD PRESSURE: 125 MMHG | TEMPERATURE: 98.2 F | BODY MASS INDEX: 28.34 KG/M2 | WEIGHT: 166 LBS | HEIGHT: 64 IN

## 2020-11-25 DIAGNOSIS — M54.50 ACUTE LEFT-SIDED LOW BACK PAIN WITHOUT SCIATICA: ICD-10-CM

## 2020-11-25 DIAGNOSIS — M25.552 LEFT HIP PAIN: ICD-10-CM

## 2020-11-25 DIAGNOSIS — M16.12 ARTHRITIS OF LEFT HIP: Primary | ICD-10-CM

## 2020-11-25 PROCEDURE — 3008F BODY MASS INDEX DOCD: CPT | Performed by: ANESTHESIOLOGY

## 2020-11-25 PROCEDURE — 99244 OFF/OP CNSLTJ NEW/EST MOD 40: CPT | Performed by: ANESTHESIOLOGY

## 2020-11-25 RX ORDER — GABAPENTIN 300 MG/1
300 CAPSULE ORAL
Qty: 30 CAPSULE | Refills: 1 | Status: SHIPPED | OUTPATIENT
Start: 2020-11-25 | End: 2021-02-19

## 2020-11-30 ENCOUNTER — HOSPITAL ENCOUNTER (OUTPATIENT)
Dept: RADIOLOGY | Facility: HOSPITAL | Age: 50
Discharge: HOME | End: 2020-11-30
Attending: ANESTHESIOLOGY
Payer: COMMERCIAL

## 2020-11-30 DIAGNOSIS — M54.50 ACUTE LEFT-SIDED LOW BACK PAIN WITHOUT SCIATICA: ICD-10-CM

## 2020-11-30 PROCEDURE — 72110 X-RAY EXAM L-2 SPINE 4/>VWS: CPT

## 2020-12-07 ENCOUNTER — TELEPHONE (OUTPATIENT)
Dept: PAIN MEDICINE | Facility: CLINIC | Age: 50
End: 2020-12-07

## 2020-12-07 ENCOUNTER — HOSPITAL ENCOUNTER (OUTPATIENT)
Dept: RADIOLOGY | Facility: CLINIC | Age: 50
Discharge: HOME | End: 2020-12-07
Attending: ANESTHESIOLOGY | Admitting: ANESTHESIOLOGY
Payer: COMMERCIAL

## 2020-12-07 ENCOUNTER — OFFICE VISIT (OUTPATIENT)
Dept: FAMILY MEDICINE CLINIC | Facility: CLINIC | Age: 50
End: 2020-12-07
Payer: COMMERCIAL

## 2020-12-07 VITALS
TEMPERATURE: 98 F | HEART RATE: 89 BPM | BODY MASS INDEX: 27.76 KG/M2 | OXYGEN SATURATION: 96 % | WEIGHT: 162.6 LBS | HEIGHT: 64 IN

## 2020-12-07 VITALS
RESPIRATION RATE: 18 BRPM | OXYGEN SATURATION: 95 % | TEMPERATURE: 99 F | DIASTOLIC BLOOD PRESSURE: 102 MMHG | HEART RATE: 88 BPM | SYSTOLIC BLOOD PRESSURE: 154 MMHG

## 2020-12-07 DIAGNOSIS — R03.0 ELEVATED BLOOD PRESSURE READING WITHOUT DIAGNOSIS OF HYPERTENSION: ICD-10-CM

## 2020-12-07 DIAGNOSIS — M25.552 LEFT HIP PAIN: ICD-10-CM

## 2020-12-07 DIAGNOSIS — M54.16 RIGHT LUMBAR RADICULOPATHY: Primary | ICD-10-CM

## 2020-12-07 DIAGNOSIS — M16.12 ARTHRITIS OF LEFT HIP: ICD-10-CM

## 2020-12-07 DIAGNOSIS — I10 ESSENTIAL HYPERTENSION: Primary | ICD-10-CM

## 2020-12-07 PROCEDURE — 77002 NEEDLE LOCALIZATION BY XRAY: CPT

## 2020-12-07 PROCEDURE — 77002 NEEDLE LOCALIZATION BY XRAY: CPT | Performed by: ANESTHESIOLOGY

## 2020-12-07 PROCEDURE — 3008F BODY MASS INDEX DOCD: CPT | Performed by: FAMILY MEDICINE

## 2020-12-07 PROCEDURE — 20610 DRAIN/INJ JOINT/BURSA W/O US: CPT | Performed by: ANESTHESIOLOGY

## 2020-12-07 PROCEDURE — 99213 OFFICE O/P EST LOW 20 MIN: CPT | Performed by: FAMILY MEDICINE

## 2020-12-07 RX ORDER — LISINOPRIL 20 MG/1
20 TABLET ORAL DAILY
Qty: 30 TABLET | Refills: 5 | Status: SHIPPED | OUTPATIENT
Start: 2020-12-07 | End: 2020-12-07 | Stop reason: SDUPTHER

## 2020-12-07 RX ORDER — LISINOPRIL 20 MG/1
20 TABLET ORAL DAILY
Qty: 30 TABLET | Refills: 5 | Status: SHIPPED | OUTPATIENT
Start: 2020-12-07 | End: 2021-02-19

## 2020-12-07 RX ORDER — METHYLPREDNISOLONE ACETATE 80 MG/ML
80 INJECTION, SUSPENSION INTRA-ARTICULAR; INTRALESIONAL; INTRAMUSCULAR; PARENTERAL; SOFT TISSUE ONCE
Status: COMPLETED | OUTPATIENT
Start: 2020-12-07 | End: 2020-12-07

## 2020-12-07 RX ORDER — LIDOCAINE HYDROCHLORIDE 10 MG/ML
5 INJECTION, SOLUTION EPIDURAL; INFILTRATION; INTRACAUDAL; PERINEURAL ONCE
Status: COMPLETED | OUTPATIENT
Start: 2020-12-07 | End: 2020-12-07

## 2020-12-07 RX ADMIN — IOHEXOL 1 ML: 300 INJECTION, SOLUTION INTRAVENOUS at 10:06

## 2020-12-07 RX ADMIN — LIDOCAINE HYDROCHLORIDE 3 ML: 10 INJECTION, SOLUTION EPIDURAL; INFILTRATION; INTRACAUDAL; PERINEURAL at 10:05

## 2020-12-07 RX ADMIN — METHYLPREDNISOLONE ACETATE 80 MG: 80 INJECTION, SUSPENSION INTRA-ARTICULAR; INTRALESIONAL; INTRAMUSCULAR; SOFT TISSUE at 10:06

## 2020-12-07 RX ADMIN — LIDOCAINE HYDROCHLORIDE 2 ML: 20 INJECTION, SOLUTION EPIDURAL; INFILTRATION; INTRACAUDAL at 10:06

## 2020-12-14 ENCOUNTER — TELEPHONE (OUTPATIENT)
Dept: PAIN MEDICINE | Facility: CLINIC | Age: 50
End: 2020-12-14

## 2020-12-18 ENCOUNTER — TELEPHONE (OUTPATIENT)
Dept: PAIN MEDICINE | Facility: CLINIC | Age: 50
End: 2020-12-18

## 2020-12-18 ENCOUNTER — HOSPITAL ENCOUNTER (OUTPATIENT)
Dept: MRI IMAGING | Facility: HOSPITAL | Age: 50
Discharge: HOME | End: 2020-12-18
Attending: ANESTHESIOLOGY
Payer: COMMERCIAL

## 2020-12-18 DIAGNOSIS — M54.16 RIGHT LUMBAR RADICULOPATHY: ICD-10-CM

## 2020-12-18 PROCEDURE — 72148 MRI LUMBAR SPINE W/O DYE: CPT

## 2020-12-18 PROCEDURE — G1004 CDSM NDSC: HCPCS

## 2020-12-22 DIAGNOSIS — M25.552 LEFT HIP PAIN: Primary | ICD-10-CM

## 2020-12-23 ENCOUNTER — TELEPHONE (OUTPATIENT)
Dept: OBGYN CLINIC | Facility: CLINIC | Age: 50
End: 2020-12-23

## 2020-12-23 ENCOUNTER — TRANSCRIBE ORDERS (OUTPATIENT)
Dept: ADMINISTRATIVE | Facility: HOSPITAL | Age: 50
End: 2020-12-23

## 2020-12-23 DIAGNOSIS — M25.552 LEFT HIP PAIN: Primary | ICD-10-CM

## 2020-12-30 ENCOUNTER — TELEMEDICINE (OUTPATIENT)
Dept: PAIN MEDICINE | Facility: CLINIC | Age: 50
End: 2020-12-30
Payer: COMMERCIAL

## 2020-12-30 DIAGNOSIS — M16.12 ARTHRITIS OF LEFT HIP: ICD-10-CM

## 2020-12-30 DIAGNOSIS — G89.4 CHRONIC PAIN SYNDROME: Primary | ICD-10-CM

## 2020-12-30 DIAGNOSIS — M25.552 LEFT HIP PAIN: ICD-10-CM

## 2020-12-30 PROCEDURE — 99441 PR PHYS/QHP TELEPHONE EVALUATION 5-10 MIN: CPT | Performed by: NURSE PRACTITIONER

## 2021-01-06 ENCOUNTER — TELEPHONE (OUTPATIENT)
Dept: FAMILY MEDICINE CLINIC | Facility: CLINIC | Age: 51
End: 2021-01-06

## 2021-01-06 NOTE — TELEPHONE ENCOUNTER
Referral for [facility] completed/scan         Valid  Referral #: [id number]  Effective: 01/06/2021  Expires: 04/05/2021    Referral [id number] has been successfully submitted  [facility]   [address]   [place], [place] [id number]   PATIENT'S INSURANCE  Member ID: [id number]  PRIMARY CARE PHYSICIAN  [facility]PI: [id number]   From  [address] [facility]   NPI: [id number]  [address]   [address]   [place], [address]   To  Amy Ibarra  NPI: [id number]  [address] [facility], [facility] [address], [facility] [address]   Service Type: Medical Care (Consult and Treat)   Place of Service: Office   Note to Patient   This referral is valid at any location for the above group       Clinical Information    Diagnoses (3)     Diagnosis    1  R31 1 - Benign essential microscopic hematuria    2  R35 0 - Frequency of micturition    3  Z00 8 - Encounter for other general examination      Procedures (1)     Procedure    1  34393

## 2021-01-11 ENCOUNTER — TELEPHONE (OUTPATIENT)
Dept: FAMILY MEDICINE CLINIC | Facility: CLINIC | Age: 51
End: 2021-01-11

## 2021-01-11 NOTE — TELEPHONE ENCOUNTER
Valid  Referral #: [id number]  Effective: 01/11/2021  Expires: 04/10/2021    Referral [id number] has been successfully submitted  [facility]   [address]   [place] [id number]   PATIENT'S INSURANCE  Member ID: [id number]  PRIMARY CARE PHYSICIAN  [facility]   NPI: [id number]   From  [facility] [address] [facility]   NPI: [id number]  [address] [facility], [address]   To  [place] [address]  NPI: [id number]  [address]  [facility], [address] [facility]   ,   Service Type: Medical Care (Consult and Treat)   Place of Service: On [facility]   Note to Patient   This referral is valid at any location for the above group       Clinical Information    Diagnoses (1)     Diagnosis    1  M25 552 - Pain in left hip      Procedures (2)     Procedure    1  60440    2  94347    Disclaimer:   [facility] and [facility] ([facility]) will pay for only those services covered under the [facility] Contract which are specifically noted and requested by the PCP or OBGYN on the referral  If any additional services, testing, or follow-up care are required, the PCP

## 2021-01-12 ENCOUNTER — HOSPITAL ENCOUNTER (OUTPATIENT)
Dept: RADIOLOGY | Facility: HOSPITAL | Age: 51
Discharge: HOME | End: 2021-01-12
Attending: ANESTHESIOLOGY
Payer: COMMERCIAL

## 2021-01-12 ENCOUNTER — HOSPITAL ENCOUNTER (OUTPATIENT)
Dept: MRI IMAGING | Facility: HOSPITAL | Age: 51
Discharge: HOME | End: 2021-01-12
Attending: ANESTHESIOLOGY
Payer: COMMERCIAL

## 2021-01-12 DIAGNOSIS — M25.552 LEFT HIP PAIN: ICD-10-CM

## 2021-01-12 PROCEDURE — G1004 CDSM NDSC: HCPCS

## 2021-01-12 PROCEDURE — 73722 MRI JOINT OF LWR EXTR W/DYE: CPT

## 2021-01-12 PROCEDURE — 27095 INJECTION FOR HIP X-RAY: CPT

## 2021-01-12 PROCEDURE — 77002 NEEDLE LOCALIZATION BY XRAY: CPT

## 2021-01-12 PROCEDURE — A9585 GADOBUTROL INJECTION: HCPCS | Performed by: ANESTHESIOLOGY

## 2021-01-12 RX ORDER — LIDOCAINE HYDROCHLORIDE 10 MG/ML
30 INJECTION, SOLUTION EPIDURAL; INFILTRATION; INTRACAUDAL; PERINEURAL ONCE
Status: COMPLETED | OUTPATIENT
Start: 2021-01-12 | End: 2021-01-12

## 2021-01-12 RX ADMIN — LIDOCAINE HYDROCHLORIDE 5 ML: 10 INJECTION, SOLUTION EPIDURAL; INFILTRATION; INTRACAUDAL; PERINEURAL at 14:36

## 2021-01-12 RX ADMIN — GADOBUTROL 0.2 ML: 604.72 INJECTION INTRAVENOUS at 14:36

## 2021-01-12 RX ADMIN — IOHEXOL 3 ML: 300 INJECTION, SOLUTION INTRAVENOUS at 14:36

## 2021-01-14 ENCOUNTER — TELEPHONE (OUTPATIENT)
Dept: PAIN MEDICINE | Facility: CLINIC | Age: 51
End: 2021-01-14

## 2021-01-14 ENCOUNTER — TELEPHONE (OUTPATIENT)
Dept: OBGYN CLINIC | Facility: HOSPITAL | Age: 51
End: 2021-01-14

## 2021-01-14 NOTE — TELEPHONE ENCOUNTER
S/w pt, advised of above  Pt verbalized understanding and appreciation  Will dario w/ Dr Mishel Licona in Ortho       SL aware

## 2021-01-14 NOTE — TELEPHONE ENCOUNTER
MRI reviewed - he has labral tear and significant arthritis  Treatment would be continued injections with Dr Lesley Hutchinson or a hip replacement      SUZE

## 2021-01-14 NOTE — TELEPHONE ENCOUNTER
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

## 2021-01-14 NOTE — TELEPHONE ENCOUNTER
Patient is calling because he has seen Dr Jaki Mccauley & now has to come back to Dr Mechelle Samuels  Patient declined an appt stating that he wants Dr Mechelle Samuels to look at the MRI dated 1/12/21 & once he knows that Dr Mechelle Samuels have seen it he will come in to see him        [phone]

## 2021-01-18 DIAGNOSIS — F41.9 ANXIETY: ICD-10-CM

## 2021-01-18 RX ORDER — LORAZEPAM 1 MG/1
1 TABLET ORAL EVERY 8 HOURS PRN
Qty: 90 TABLET | Refills: 5 | Status: SHIPPED | OUTPATIENT
Start: 2021-01-18 | End: 2021-08-03

## 2021-01-22 ENCOUNTER — OFFICE VISIT (OUTPATIENT)
Dept: OBGYN CLINIC | Facility: CLINIC | Age: 51
End: 2021-01-22
Payer: COMMERCIAL

## 2021-01-22 VITALS
SYSTOLIC BLOOD PRESSURE: 138 MMHG | DIASTOLIC BLOOD PRESSURE: 90 MMHG | BODY MASS INDEX: 27.66 KG/M2 | WEIGHT: 162 LBS | HEIGHT: 64 IN

## 2021-01-22 DIAGNOSIS — M16.12 PRIMARY OSTEOARTHRITIS OF LEFT HIP: Primary | ICD-10-CM

## 2021-01-22 DIAGNOSIS — Z01.818 PREOP TESTING: ICD-10-CM

## 2021-01-22 DIAGNOSIS — M25.852 FEMOROACETABULAR IMPINGEMENT OF LEFT HIP: ICD-10-CM

## 2021-01-22 DIAGNOSIS — M16.12 PRIMARY OSTEOARTHRITIS OF LEFT HIP: ICD-10-CM

## 2021-01-22 DIAGNOSIS — Z01.812 PRE-OPERATIVE LABORATORY EXAMINATION: Primary | ICD-10-CM

## 2021-01-22 PROCEDURE — 3008F BODY MASS INDEX DOCD: CPT | Performed by: ORTHOPAEDIC SURGERY

## 2021-01-22 PROCEDURE — 3080F DIAST BP >= 90 MM HG: CPT | Performed by: ORTHOPAEDIC SURGERY

## 2021-01-22 PROCEDURE — 99214 OFFICE O/P EST MOD 30 MIN: CPT | Performed by: ORTHOPAEDIC SURGERY

## 2021-01-22 PROCEDURE — 3075F SYST BP GE 130 - 139MM HG: CPT | Performed by: ORTHOPAEDIC SURGERY

## 2021-01-22 RX ORDER — CHLORHEXIDINE GLUCONATE 0.12 MG/ML
15 RINSE ORAL ONCE
Status: CANCELLED | OUTPATIENT
Start: 2021-01-22 | End: 2021-01-22

## 2021-01-22 RX ORDER — CHLORHEXIDINE GLUCONATE 4 G/100ML
SOLUTION TOPICAL DAILY PRN
Status: CANCELLED | OUTPATIENT
Start: 2021-01-22

## 2021-01-22 RX ORDER — MULTIVIT-MIN/IRON FUM/FOLIC AC 7.5 MG-4
1 TABLET ORAL DAILY
Qty: 30 TABLET | Refills: 0 | Status: SHIPPED | OUTPATIENT
Start: 2021-01-22 | End: 2021-03-22

## 2021-01-22 RX ORDER — FOLIC ACID 1 MG/1
1 TABLET ORAL DAILY
Qty: 30 TABLET | Refills: 0 | Status: SHIPPED | OUTPATIENT
Start: 2021-01-22 | End: 2021-03-03 | Stop reason: HOSPADM

## 2021-01-22 RX ORDER — CEFAZOLIN SODIUM 2 G/50ML
2000 SOLUTION INTRAVENOUS ONCE
Status: CANCELLED | OUTPATIENT
Start: 2021-03-03 | End: 2021-01-22

## 2021-01-22 RX ORDER — ASCORBIC ACID 500 MG
500 TABLET ORAL 2 TIMES DAILY
Qty: 60 TABLET | Refills: 0 | Status: SHIPPED | OUTPATIENT
Start: 2021-01-22 | End: 2021-03-03 | Stop reason: HOSPADM

## 2021-01-22 RX ORDER — FERROUS SULFATE TAB EC 324 MG (65 MG FE EQUIVALENT) 324 (65 FE) MG
324 TABLET DELAYED RESPONSE ORAL
Qty: 60 TABLET | Refills: 0 | Status: SHIPPED | OUTPATIENT
Start: 2021-01-22 | End: 2021-03-03 | Stop reason: HOSPADM

## 2021-01-22 NOTE — PROGRESS NOTES
Orthopaedic Surgery Note    CC: Left Hip Pain      HPI:  Freeman Torres is a 46 y o male who presents with left hip pain  Updated history:  Mr  Katerina Torrez returns for repeat evaluation of his left hip pain  This pain has persisted and is worsening over time  He has had CSI that provided only 50% relief and he states that the pain continues to limit his work, ADLs, and ability to walk  He has tried alleve and tylenol and these do not provide signficant relief  He has not yet tried therapy as he felt this would make things worse - we will ask him to go to PT but I do not think this will help  If PT does provide pain relief we will cancel his surgery  We do need to wait until first week in March at earliest due to [facility]  Of note, he does have CP and reports that he has always had a limp and he does not have motor control of the left foot and ankle  Previous history:  Patient has for the past 3 weeks, he has had left deep hip pain and buttock pain  Patient does not know any traumatic or inciting events but notes the pain just came on  He describes the pain as a 7/10 moderate pain that is aching and sharp and causes stiffness  PA and is worse with standing and walking and better with rest   It does interfere with his activities of daily living and wakes him up at night  He has tried NSAIDs, ice, and heat which have not fully help  He has never had this pain before          ALLERGIES:  No Known Allergies    CURRENT MEDICATIONS:  Current Outpatient Medications   Medication Sig Dispense Refill    buPROPion (WELLBUTRIN SR) 150 mg 12 hr tablet Take 1 tablet (150 mg total) by mouth 2 (two) times a day 60 tablet 5    gabapentin (NEURONTIN) 300 mg capsule Take 1 capsule (300 mg total) by mouth daily at bedtime 30 capsule 1    HYDROcodone-acetaminophen (NORCO) 5-325 mg per tablet Take 1 tablet by mouth every 6 (six) hours as needed for painMax Daily Amount: 4 tablets 30 tablet 0    lisinopril (ZESTRIL) 20 mg tablet Take 1 tablet (20 mg total) by mouth daily 30 tablet 5    LORazepam (ATIVAN) 1 mg tablet Take 1 tablet (1 mg total) by mouth every 8 (eight) hours as needed for anxiety 90 tablet 5    naproxen (NAPROSYN) 500 mg tablet Take 1 tablet (500 mg total) by mouth 2 (two) times a day with meals 30 tablet 5    ascorbic acid (VITAMIN C) 500 MG tablet Take 1 tablet (500 mg total) by mouth 2 (two) times a day 60 tablet 0    ferrous sulfate 324 (65 Fe) mg Take 1 tablet (324 mg total) by mouth 2 (two) times a day before meals 60 tablet 0    folic acid (FOLVITE) 1 mg tablet Take 1 tablet (1 mg total) by mouth daily 30 tablet 0    Multiple Vitamins-Minerals (multivitamin with minerals) tablet Take 1 tablet by mouth daily 30 tablet 0     No current facility-administered medications for this visit          PAST MEDICAL HISTORY  Past Medical History:   Diagnosis Date    Allergic     Anxiety     Depression        SURGICAL HISTORY  Past Surgical History:   Procedure Laterality Date    FL INJECTION LEFT HIP (ARTHROGRAM)  1/12/2021    FOOT CAPSULE RELEASE W/ PERCUTANEOUS HEEL CORD LENGTHENING, TIBIAL TENDON TRANSFER      HAND SURGERY      HERNIA REPAIR         FAMILY HISTORY  Family History   Problem Relation Age of Onset    Diabetes Father     Hypertension Father        SOCIAL HISTORY  Social History     Socioeconomic History    Marital status: /Civil Union     Spouse name: Not on file    Number of children: Not on file    Years of education: Not on file    Highest education level: Not on file   Occupational History    Not on file   Social Needs    Financial resource strain: Not on file    Food insecurity     Worry: Not on file     Inability: Not on file   [employer] needs     Medical: Not on file     Non-medical: Not on file   Tobacco Use    Smoking status: Current Every Day Smoker     Packs/day: 0 50     Years: 35 00     Pack years: 17 50     Types: Cigarettes    Smokeless tobacco: Never Used   Substance and Sexual Activity    Alcohol use: Not Currently    Drug use: No    Sexual activity: Yes     Partners: Male, Female   Lifestyle    Physical activity     Days per week: Not on file     Minutes per session: Not on file    Stress: Not on file   Relationships    Social connections     Talks on phone: Not on file     Gets together: Not on file     Attends Yazidi service: Not on file     Active member of club or organization: Not on file     Attends meetings of clubs or organizations: Not on file     Relationship status: Not on file    Intimate partner violence     Fear of current or ex partner: Not on file     Emotionally abused: Not on file     Physically abused: Not on file     Forced sexual activity: Not on file   Other Topics Concern    Not on file   Social History Narrative    Not on file         Review of Systems   Metal Allergy: no  History of MRSA:no  History of DVT or PE:no  Active dental issues:no  Anesthesia complications:no    Patient indicated positive for vision problems, joint pain, back pain, and anxiety  Otherwise negative except per above and HPI  Physical Exam    Vitals  Vitals:    01/22/21 1003   BP: 138/90       BMI  Body mass index is 27 81 kg/m²  GENERAL: No acute distress  Alert and oriented  Well nourished and well hydrated  Appears stated age  HEENT : Normocephalic, atraumatic  Extraocular movements intact  Mask in place  NECK: Supple, trachea midline  LUNGS: Adequate and symmetric respiratory effort  No intercostal retractions or accessory muscle use  HEART: Extremities warm and perfused  ABDOMEN: Nondistended  SKIN: Warm and dry, no rash  Left  Hip Exam  Extension: 0  Flexion: 120  Internal/External Rotation: 5/20   Leg is noted to be shorter than other leg   - leg length is about 1-1 5cm shorter compared to contralateral   This appears to be in the distal femur      Sensation Intact to Light Touch in Sural, Saphenous, Tibial, Superficial Peroneal, and Deep Peroneal Nerve Distribution  Motor function not intact in Tibialis Anterior, Gastrocnemius, Soleus, Extensor Hallucis Longus, and Flexor Hallucis Longus Muscles  (Patient reports that this is baseline due to CP)  Extremity Warm and Well Perfused  Imaging  A) Imaging modality available  Radiographs: yes  MRI scan: yes  CT scan: no    B) Imaging findings  Subchondral cysts: no  Subchondral sclerosis: yes  Periarticular osteophytes: no  Joint subluxation: no  Joint space narrowing: yes  Bone-on-bone articulations: no  Avascular necrosis: no    CAM deformity    MRI demonsrtates labral tear with significant damage to articular cartilage  Assessment and Plan  Left Hip CAM Deformity  Left Hip Osteoarthritis      - patient has attempted PO meds (NSAIDs and tylenol), CSI, activity modification with little relief of pain  Recommend trying PT  Recommend L DAA MIREYA  - he does smoke  We spent significant time today discussing the smoking and increased risk for complications  We discussed the role of total joint replacement  All non-surgical and surgical treatment options were discussed  I reviewed the procedure in detail  All of the risks, benefits, pro's and con's of total joint arthroplasty were discussed  All complications were discussed pre-operatively including but not limited to pain, infection, scar, stiffness, bleeding, blood loss, need for transfusion, neurovascular injury, implant failure, fracture, DVT, PE, MI, CVA, limb length inequality, dislocation, loss of limb, loss of life, need for additional surgery  All of the patient's questions were answered  2   DVT Prophylaxis: Patient is at normal risk for VTE  We will plan on using early ambulation with mechanical squeezers and BID dosing of aspirin    3  Antimicrobial Prophylaxis: The patient will be given Ancef perioperatively to decrease the risk of infection    4  Risk of bleeding:   We will use TXA perioperatively to minimize the risk of bleeding  5   Cardiovascular: The patient is not on a beta blocker    6  Discharge Planning:  The planned discharge is to home     7  Medical Conditions: htn (130-140s)  - will obtain preop labs and medical risk stratifcation with Dr Randy Gerard MD  Adult Reconstruction Surgery  Department of [place] [address]  11:13 AM

## 2021-01-29 ENCOUNTER — TELEPHONE (OUTPATIENT)
Dept: FAMILY MEDICINE CLINIC | Facility: CLINIC | Age: 51
End: 2021-01-29

## 2021-01-29 NOTE — TELEPHONE ENCOUNTER
[id number]  [id number]  [id number]    [facility] PT QT      Valid Referral #: [id number] Effective: 01/29/2021 Expires: 04/28/2021   Referral [id number] has been successfully submitted  [facility]  [address]  [place] [id number]    PATIENT'S INSURANCE  Member ID: [id number]    PRIMARY CARE PHYSICIAN  [facility]  NPI: [id number]  From  [address] [place] [address] [facility]  NPI: [id number]  [address]  [address] [facility] [address]  [address] [facility], [address]  To  [address]  NPI: [id number]  [address]  [facility], [address] [facility]  ,    Service Type: Medical Care (Consult and Treat)  Place of Service: Office    Note to Patient  This referral is valid at any location for the above group    Clinical Information     Diagnoses (1)    Diagnosis  1   M16 12 - Unilateral primary osteoarthritis, left hip     Procedures (1)    Procedure  1   [address]204

## 2021-01-30 LAB
ABO GROUP BLD: NORMAL
ALBUMIN SERPL-MCNC: 4.5 G/DL (ref 3.8–4.9)
ALBUMIN/GLOB SERPL: 1.8 {RATIO} (ref 1.2–2.2)
ALP SERPL-CCNC: 63 IU/L (ref 39–117)
ALT SERPL-CCNC: 69 IU/L (ref 0–44)
APTT PPP: 29 SEC (ref 24–33)
AST SERPL-CCNC: 38 IU/L (ref 0–40)
BASOPHILS # BLD AUTO: 0 X10E3/UL (ref 0–0.2)
BASOPHILS NFR BLD AUTO: 1 %
BILIRUB SERPL-MCNC: 0.4 MG/DL (ref 0–1.2)
BLD GP AB SCN SERPL QL: NEGATIVE
BUN SERPL-MCNC: 15 MG/DL (ref 6–24)
BUN/CREAT SERPL: 16 (ref 9–20)
CALCIUM SERPL-MCNC: 9.2 MG/DL (ref 8.7–10.2)
CHLORIDE SERPL-SCNC: 103 MMOL/L (ref 96–106)
CO2 SERPL-SCNC: 23 MMOL/L (ref 20–29)
CREAT SERPL-MCNC: 0.92 MG/DL (ref 0.76–1.27)
EOSINOPHIL # BLD AUTO: 0.2 X10E3/UL (ref 0–0.4)
EOSINOPHIL NFR BLD AUTO: 2 %
ERYTHROCYTE [DISTWIDTH] IN BLOOD BY AUTOMATED COUNT: 13.3 % (ref 11.6–15.4)
EST. AVERAGE GLUCOSE BLD GHB EST-MCNC: 111 MG/DL
GLOBULIN SER-MCNC: 2.5 G/DL (ref 1.5–4.5)
GLUCOSE SERPL-MCNC: 99 MG/DL (ref 65–99)
HBA1C MFR BLD: 5.5 % (ref 4.8–5.6)
HCT VFR BLD AUTO: 50.3 % (ref 37.5–51)
HGB BLD-MCNC: 17.5 G/DL (ref 13–17.7)
IMM GRANULOCYTES # BLD: 0 X10E3/UL (ref 0–0.1)
IMM GRANULOCYTES NFR BLD: 1 %
INR PPP: 1 (ref 0.9–1.2)
LYMPHOCYTES # BLD AUTO: 3.8 X10E3/UL (ref 0.7–3.1)
LYMPHOCYTES NFR BLD AUTO: 42 %
MCH RBC QN AUTO: 33.2 PG (ref 26.6–33)
MCHC RBC AUTO-ENTMCNC: 34.8 G/DL (ref 31.5–35.7)
MCV RBC AUTO: 95 FL (ref 79–97)
MONOCYTES # BLD AUTO: 0.9 X10E3/UL (ref 0.1–0.9)
MONOCYTES NFR BLD AUTO: 10 %
NEUTROPHILS # BLD AUTO: 3.9 X10E3/UL (ref 1.4–7)
NEUTROPHILS NFR BLD AUTO: 44 %
PLATELET # BLD AUTO: 194 X10E3/UL (ref 150–450)
POTASSIUM SERPL-SCNC: 4.5 MMOL/L (ref 3.5–5.2)
PROT SERPL-MCNC: 7 G/DL (ref 6–8.5)
PROTHROMBIN TIME: 10.1 SEC (ref 9.1–12)
RBC # BLD AUTO: 5.27 X10E6/UL (ref 4.14–5.8)
RH BLD: POSITIVE
SL AMB EGFR AFRICAN AMERICAN: 111 ML/MIN/1.73
SL AMB EGFR NON AFRICAN AMERICAN: 96 ML/MIN/1.73
SODIUM SERPL-SCNC: 140 MMOL/L (ref 134–144)
WBC # BLD AUTO: 8.8 X10E3/UL (ref 3.4–10.8)

## 2021-02-02 ENCOUNTER — EVALUATION (OUTPATIENT)
Dept: PHYSICAL THERAPY | Facility: CLINIC | Age: 51
End: 2021-02-02
Payer: COMMERCIAL

## 2021-02-02 DIAGNOSIS — M16.12 PRIMARY OSTEOARTHRITIS OF LEFT HIP: ICD-10-CM

## 2021-02-02 PROCEDURE — 97162 PT EVAL MOD COMPLEX 30 MIN: CPT | Performed by: PHYSICAL THERAPIST

## 2021-02-02 NOTE — PROGRESS NOTES
PT Evaluation     Today's date: 2021  Patient name: Joi Burr  : 1970  MRN: [id number]  Referring provider: Steve Ching MD  Dx:   Encounter Diagnosis     ICD-10-CM    1  Primary osteoarthritis of left hip  M16 12 Ambulatory referral to Physical Therapy                  Assessment  Assessment details: Pt is a 46y o  year old male coming to outpatient PT with a diagnosis of L hip OA in preparation for MIREYA  Pt presents with increased pain,  decreased L hip ROM, mild strength deficits, and overall decreased functional mobility  Pt would benefit from skilled PT services in order to address these deficits and reach maximum level of function  Thank you kindly for the referral!  Impairments: abnormal or restricted ROM, activity intolerance, impaired physical strength, lacks appropriate home exercise program, pain with function and weight-bearing intolerance  Barriers to therapy: 1  High insurance co pay ( $40)  Understanding of Dx/Px/POC: good   Prognosis: fair    Goals  STG's ( 3-4 weeks)  1  Pt will be independent in HEP  2  Decrease pain to 5/10 at worst    LTG's ( 4-6 weeks)  1  Improve FOTO score by 8-10 points  2  Pt will have less pain with walking activities  3  Pt will have less pain going up and down the steps  4  Pt will be able to sleep with less pain    Plan  Patient would benefit from: PT eval and skilled physical therapy  Planned modality interventions: cryotherapy  Planned therapy interventions: manual therapy, joint mobilization, neuromuscular re-education, strengthening, stretching, therapeutic activities, therapeutic exercise, home exercise program, functional ROM exercises and flexibility  Frequency: 2x week  Duration in weeks: 6  Plan of Care beginning date: 2021  Plan of Care expiration date: 3/16/2021  Treatment plan discussed with: patient        Subjective Evaluation    History of Present Illness  Mechanism of injury: Pt reports L hip pain onset    Pt did not have any specific injury  X-ray and MRI testing were performed  Cortisone injection helped about 50%  Pt has increased pain when going up and down steps, walking inside his home, lifting and carrying groceries and a laundry basket  Pt is not sleeping well at night, about 3-4 hours per night due to pain  Pt is not able to work at this time  Pt is not able to stand  30 minutes  Pt has a history of CP, with pt reports of severe L calf atrophy and femur is shorter in length  Pt is not able to lay on his L side and has increased pain in supine  Pt has pain with sitting  Pt has constant pain  Pt has increased pain and difficulty when putting on his snow boots today- it took about 10 minutes  Work: commercial and residential ; digging holes, climbing ladders    Gait: (+) antalgic gait, decreased weight bearing L LE  Hobbies: mountain biking  Pain  At best pain ratin  At worst pain ratin  Location: L hip  Quality: sharp and tight  Relieving factors: medications    Social Support  Steps to enter house: yes  7  Stairs in house: no   L[facility] in: [facility]    Employment status: not working    Diagnostic Tests  X-ray: abnormal  MRI studies: abnormal  Treatments  Previous treatment: injection treatment and medication  Patient Goals  Patient goals for therapy: decreased pain  Patient goal: to learn appropriate exercises to prepare for surgery        Objective     Neurological Testing     Sensation     Hip   Left Hip   Intact: light touch    Right Hip   Intact: light touch    Reflexes   Left   Patellar (L4): normal (2+)    Right   Patellar (L4): normal (2+)    Additional Neurological Details  Unable to elicit L achilles reflex    Active Range of Motion   Left Hip   Flexion: 105 degrees   Extension: -5 degrees with pain  External rotation (90/90): 15 degrees   Internal rotation (90/90): 20 degrees     Additional Active Range of Motion Details  HS flexibility: R: 90*  L:82* with opposite knee flexed  No TTP L hip Passive Range of Motion   Left Hip   Flexion: 130 degrees with pain  Extension: -5 degrees with pain    Strength/Myotome Testing     Left Hip   Planes of Motion   Flexion: 4  Extension: 4  Abduction: 4+  External rotation: 4  Internal rotation: 4    Right Hip   Planes of Motion   Flexion: 4+  External rotation: 5  Internal rotation: 5    Left Knee   Flexion: 4  Extension: 5    Right Knee   Normal strength            Dx:  L hip OA  EPOC: 3/16/21  CO-MORBIDITIES: CP, L LE shorter  PERSONAL FACTORS:   Precautions: limit weight bearing, L gastroc atrophy, L ankle stiffness    Manuals             L hip PROM             L hip flexor MFR             L hip inferior glides                          Neuro Re-Ed                                                                                                        Ther Ex             bike             LAQ             SLR             S/l hip abduction             hooklying ball squeeze             bridges             Hip flexor stretch             Quad sets w/ towel roll             Gentle SKTC w/ towel             Seated hip IR/ ER AROM                          Gait Training                                       Modalities             Cp post tx- prn

## 2021-02-04 ENCOUNTER — OFFICE VISIT (OUTPATIENT)
Dept: PHYSICAL THERAPY | Facility: CLINIC | Age: 51
End: 2021-02-04
Payer: COMMERCIAL

## 2021-02-04 ENCOUNTER — TELEPHONE (OUTPATIENT)
Dept: OBGYN CLINIC | Facility: CLINIC | Age: 51
End: 2021-02-04

## 2021-02-04 DIAGNOSIS — M16.12 PRIMARY OSTEOARTHRITIS OF LEFT HIP: Primary | ICD-10-CM

## 2021-02-04 PROCEDURE — 97140 MANUAL THERAPY 1/> REGIONS: CPT

## 2021-02-04 PROCEDURE — 97110 THERAPEUTIC EXERCISES: CPT

## 2021-02-04 NOTE — PROGRESS NOTES
Daily Note     Today's date: 2021  Patient name: Theodore Bautista  : 1970  MRN: [id number]  Referring provider: Chanel Redding MD  Dx:   Encounter Diagnosis     ICD-10-CM    1  Primary osteoarthritis of left hip  M16 12                   Subjective: Pt reports non WB stance and exer's are most comfortable  Pt states experiencing pain and discomfort when standing  Objective: See treatment diary below  Pt co-treated w/ SPTA  Assessment: Tolerated treatment well  Pt presents w/ tightness in IR/ER in L hip during PROM  Instructed pt how to use and correct gait pattern w/ SPC  Pt would benefit from continued PT to increase ROM, strength, overall function, correct gait patterns, and for pain management  Plan: Continue per plan of care  Dx: L hip OA  EPOC: 3/16/21  CO-MORBIDITIES: CP, L LE shorter  PERSONAL FACTORS:   Precautions: limit weight bearing, L gastroc atrophy, L ankle stiffness;  Hx LBP    Manuals 2/4            L hip PROM NK/JK            L hip flexor MFR             L hip inferior glides              10'            Neuro Re-Ed                                                                                                        Ther Ex             bike 5'            LAQ 10            SLR 10            S/l hip abduction 10            hooklying ball squeeze 10"x10            bridges 10x10"            Hip flexor stretch 3X20"            Quad sets w/ towel roll 10x10"            Gentle SKTC w/ towel 3X20"            Seated hip IR/ ER AROM 5                         Gait Training             SPC training 1 lap                         Modalities             Cp post tx- prn np

## 2021-02-09 ENCOUNTER — OFFICE VISIT (OUTPATIENT)
Dept: PHYSICAL THERAPY | Facility: CLINIC | Age: 51
End: 2021-02-09
Payer: COMMERCIAL

## 2021-02-09 DIAGNOSIS — M16.12 PRIMARY OSTEOARTHRITIS OF LEFT HIP: Primary | ICD-10-CM

## 2021-02-09 PROCEDURE — 97140 MANUAL THERAPY 1/> REGIONS: CPT

## 2021-02-09 PROCEDURE — 97110 THERAPEUTIC EXERCISES: CPT

## 2021-02-09 NOTE — PROGRESS NOTES
Daily Note     Today's date: 2021  Patient name: Darion Corea  : 1970  MRN: [id number]  Referring provider: Katja Barroso MD  Dx:   Encounter Diagnosis     ICD-10-CM    1  Primary osteoarthritis of left hip  M16 12                   Subjective: Pt reports shoveling snow on   Pt notes having trouble sleeping  night due to pain and soreness  Objective: See treatment diary below      Assessment: Tolerated treatment well  Pt demonstrates limitations in PROM in L hip ER due to pain  Provided vc to pt to correct compensation w/ elevation of AROM hip ER  Patient would benefit from continued PT to increase strength, function, and gait, and to manage pain  Plan: Continue per plan of care  Dx: L hip OA  EPOC: 3/16/21  CO-MORBIDITIES: CP, L LE shorter  PERSONAL FACTORS:   Precautions: limit weight bearing, L gastroc atrophy, L ankle stiffness;  Hx LBP    Manuals 2/4 28           L hip PROM NK/JK NK/JK           L hip flexor MFR             L hip inferior glides              10' 8'           Neuro Re-Ed                                                                                                        Ther Ex             bike 5' 5'           LAQ 10 10 #1          SLR 10 10           S/l hip abduction 10 10           hooklying ball squeeze 10"x10 10"x10           bridges 10x10" 10"x10           Hip flexor stretch 3X20" 3x20"           Quad sets w/ towel roll 10x10" 10x10"           Gentle SKTC w/ towel 3X20" 3x20"           Seated hip IR/ ER AROM 5 5                        Gait Training             SPC training 1 lap                         Modalities             Cp post tx- prn np

## 2021-02-11 ENCOUNTER — OFFICE VISIT (OUTPATIENT)
Dept: PHYSICAL THERAPY | Facility: CLINIC | Age: 51
End: 2021-02-11
Payer: COMMERCIAL

## 2021-02-11 DIAGNOSIS — M16.12 PRIMARY OSTEOARTHRITIS OF LEFT HIP: Primary | ICD-10-CM

## 2021-02-11 PROCEDURE — 97110 THERAPEUTIC EXERCISES: CPT | Performed by: PHYSICAL THERAPIST

## 2021-02-11 NOTE — PROGRESS NOTES
Daily Note     Today's date: 2021  Patient name: Andreas Almanzar  : 1970  MRN: [id number]  Referring provider: Aurora Cantu MD  Dx:   Encounter Diagnosis     ICD-10-CM    1  Primary osteoarthritis of left hip  M16 12                   Subjective: Pt reports no pain in his hip upon arrival to therapy  Pt notes he has not done anything yet today  Therapy ex do not aggravate his symptoms  Pt does feel a intermittent sharp pain when laying down, like someone is stabbing him  Objective: See treatment diary below      Assessment: Tolerated treatment well  Patient had L hip joint pain with SLR      Plan: Continue per plan of care  Dx: L hip OA  EPOC: 3/16/21  CO-MORBIDITIES: CP, L LE shorter  PERSONAL FACTORS:   Precautions: limit weight bearing, L gastroc atrophy, L ankle stiffness;  Hx LBP    Manuals           L hip PROM NK/JK NK/JK th          L hip flexor MFR   th          L hip inferior glides   th           10' 8' 10'          Neuro Re-Ed                                                                                                        Ther Ex             bike 5' 5' 6' L2          LAQ 10 10 #1          SLR 10 10 15          S/l hip abduction 10 10 15          hooklying ball squeeze 10"x10 10"x10 10x10"          Prone quad stretch   3x20"          bridges 10x10" 10"x10 10x10" pball under legs          Hip flexor stretch 3X20" 3x20" 3x20"          Quad sets w/ towel roll 10x10" 10x10" 10x10"          Gentle SKTC w/ towel 3X20" 3x20" 3x20"          Seated hip IR/ ER AROM 5 5 10 B          HS stretch   3x20"          Gait Training             SPC training 1 lap                         Modalities             Cp post tx- prn np

## 2021-02-12 ENCOUNTER — TELEPHONE (OUTPATIENT)
Dept: OBGYN CLINIC | Facility: HOSPITAL | Age: 51
End: 2021-02-12

## 2021-02-16 ENCOUNTER — OFFICE VISIT (OUTPATIENT)
Dept: PHYSICAL THERAPY | Facility: CLINIC | Age: 51
End: 2021-02-16
Payer: COMMERCIAL

## 2021-02-16 DIAGNOSIS — M16.12 PRIMARY OSTEOARTHRITIS OF LEFT HIP: Primary | ICD-10-CM

## 2021-02-16 PROCEDURE — 97140 MANUAL THERAPY 1/> REGIONS: CPT

## 2021-02-16 PROCEDURE — 97110 THERAPEUTIC EXERCISES: CPT

## 2021-02-18 ENCOUNTER — APPOINTMENT (OUTPATIENT)
Dept: PHYSICAL THERAPY | Facility: CLINIC | Age: 51
End: 2021-02-18
Payer: COMMERCIAL

## 2021-02-19 ENCOUNTER — OFFICE VISIT (OUTPATIENT)
Dept: OBGYN CLINIC | Facility: CLINIC | Age: 51
End: 2021-02-19
Payer: COMMERCIAL

## 2021-02-19 ENCOUNTER — OFFICE VISIT (OUTPATIENT)
Dept: FAMILY MEDICINE CLINIC | Facility: CLINIC | Age: 51
End: 2021-02-19
Payer: COMMERCIAL

## 2021-02-19 ENCOUNTER — TELEPHONE (OUTPATIENT)
Dept: FAMILY MEDICINE CLINIC | Facility: CLINIC | Age: 51
End: 2021-02-19

## 2021-02-19 VITALS
HEIGHT: 64 IN | WEIGHT: 162 LBS | SYSTOLIC BLOOD PRESSURE: 128 MMHG | DIASTOLIC BLOOD PRESSURE: 70 MMHG | BODY MASS INDEX: 27.66 KG/M2

## 2021-02-19 VITALS
TEMPERATURE: 98.3 F | BODY MASS INDEX: 27.83 KG/M2 | OXYGEN SATURATION: 95 % | HEIGHT: 64 IN | HEART RATE: 114 BPM | WEIGHT: 163 LBS

## 2021-02-19 DIAGNOSIS — Z01.818 PREOP EXAMINATION: Primary | ICD-10-CM

## 2021-02-19 DIAGNOSIS — M16.12 PRIMARY OSTEOARTHRITIS OF LEFT HIP: Primary | ICD-10-CM

## 2021-02-19 DIAGNOSIS — F41.9 ANXIETY: ICD-10-CM

## 2021-02-19 DIAGNOSIS — M16.12 ARTHRITIS OF LEFT HIP: ICD-10-CM

## 2021-02-19 PROCEDURE — 3725F SCREEN DEPRESSION PERFORMED: CPT | Performed by: FAMILY MEDICINE

## 2021-02-19 PROCEDURE — 99213 OFFICE O/P EST LOW 20 MIN: CPT | Performed by: ORTHOPAEDIC SURGERY

## 2021-02-19 PROCEDURE — 99214 OFFICE O/P EST MOD 30 MIN: CPT | Performed by: FAMILY MEDICINE

## 2021-02-19 NOTE — H&P (VIEW-ONLY)
Orthopaedic Surgery Note    CC: Left Hip Pain      HPI:  Noemi Cota is a 46 y o male who presents with left hip pain  Updated history:  Patient returns and has now done 5 sessions of formal PT and also reports he has been diligently doing the HEP he was instructed in  He reports that all of this is making his hip pain worse  He is having increased pain with these exercises  He is frustrated  He would like to proceed with DAA MIREYA  Updated history:  Mr Soco Rodriguez returns for repeat evaluation of his left hip pain  This pain has persisted and is worsening over time  He has had CSI that provided only 50% relief and he states that the pain continues to limit his work, ADLs, and ability to walk  He has tried alleve and tylenol and these do not provide signficant relief  He has not yet tried therapy as he felt this would make things worse - we will ask him to go to PT but I do not think this will help  If PT does provide pain relief we will cancel his surgery  We do need to wait until first week in March at earliest due to [facility]  Of note, he does have CP and reports that he has always had a limp and he does not have motor control of the left foot and ankle  Previous history:  Patient has for the past 3 weeks, he has had left deep hip pain and buttock pain  Patient does not know any traumatic or inciting events but notes the pain just came on  He describes the pain as a 7/10 moderate pain that is aching and sharp and causes stiffness  PA and is worse with standing and walking and better with rest   It does interfere with his activities of daily living and wakes him up at night  He has tried NSAIDs, ice, and heat which have not fully help  He has never had this pain before          ALLERGIES:  No Known Allergies    CURRENT MEDICATIONS:  Current Outpatient Medications   Medication Sig Dispense Refill    ascorbic acid (VITAMIN C) 500 MG tablet Take 1 tablet (500 mg total) by mouth 2 (two) times a day 60 tablet 0    buPROPion (WELLBUTRIN SR) 150 mg 12 hr tablet Take 1 tablet (150 mg total) by mouth 2 (two) times a day 60 tablet 5    ferrous sulfate 324 (65 Fe) mg Take 1 tablet (324 mg total) by mouth 2 (two) times a day before meals 60 tablet 0    folic acid (FOLVITE) 1 mg tablet Take 1 tablet (1 mg total) by mouth daily 30 tablet 0    lisinopril (ZESTRIL) 20 mg tablet Take 1 tablet (20 mg total) by mouth daily 30 tablet 5    LORazepam (ATIVAN) 1 mg tablet Take 1 tablet (1 mg total) by mouth every 8 (eight) hours as needed for anxiety 90 tablet 5    Multiple Vitamins-Minerals (multivitamin with minerals) tablet Take 1 tablet by mouth daily 30 tablet 0    gabapentin (NEURONTIN) 300 mg capsule Take 1 capsule (300 mg total) by mouth daily at bedtime (Patient not taking: Reported on 2/2/2021) 30 capsule 1    HYDROcodone-acetaminophen (NORCO) 5-325 mg per tablet Take 1 tablet by mouth every 6 (six) hours as needed for painMax Daily Amount: 4 tablets (Patient not taking: Reported on 2/2/2021) 30 tablet 0    naproxen (NAPROSYN) 500 mg tablet Take 1 tablet (500 mg total) by mouth 2 (two) times a day with meals (Patient not taking: Reported on 2/2/2021) 30 tablet 5     No current facility-administered medications for this visit          PAST MEDICAL HISTORY  Past Medical History:   Diagnosis Date    Allergic     seasonal    Anxiety     Depression        SURGICAL HISTORY  Past Surgical History:   Procedure Laterality Date    FL INJECTION LEFT HIP (ARTHROGRAM)  1/12/2021    FOOT CAPSULE RELEASE W/ PERCUTANEOUS HEEL CORD LENGTHENING, TIBIAL TENDON TRANSFER      HAND SURGERY      HERNIA REPAIR         FAMILY HISTORY  Family History   Problem Relation Age of Onset    Diabetes Father     Hypertension Father        SOCIAL HISTORY  Social History     Socioeconomic History    Marital status: /Civil Union     Spouse name: Not on file    Number of children: Not on file    Years of education: Not on file    Highest education level: Not on file   Occupational History    Not on file   Social Needs    Financial resource strain: Not on file    Food insecurity     Worry: Not on file     Inability: Not on file    Transportation needs     Medical: Not on file     Non-medical: Not on file   Tobacco Use    Smoking status: Current Every Day Smoker     Packs/day: 0 50     Years: 35 00     Pack years: 17 50     Types: Cigarettes    Smokeless tobacco: Never Used   Substance and Sexual Activity    Alcohol use: Not Currently    Drug use: No    Sexual activity: Yes     Partners: Male, Female   Lifestyle    Physical activity     Days per week: Not on file     Minutes per session: Not on file    Stress: Not on file   Relationships    Social connections     Talks on phone: Not on file     Gets together: Not on file     Attends [employer] service: Not on file     Active member of club or organization: Not on file     Attends meetings of clubs or organizations: Not on file     Relationship status: Not on file    Intimate partner violence     Fear of current or ex partner: Not on file     Emotionally abused: Not on file     Physically abused: Not on file     Forced sexual activity: Not on file   Other Topics Concern    Not on file   Social History Narrative    Not on file         Review of Systems   Metal Allergy: no  History of MRSA:no  History of DVT or PE:no  Active dental issues:no  Anesthesia complications:no    Patient indicated positive for vision problems, joint pain, back pain, and anxiety  Otherwise negative except per above and HPI  Physical Exam    Vitals  Vitals:    02/19/21 1011   BP: 128/70       BMI  Body mass index is 27 81 kg/m²  GENERAL: No acute distress  Alert and oriented  Well nourished and well hydrated  Appears stated age  HEENT : Normocephalic, atraumatic  Extraocular movements intact  Mask in place  NECK: Supple, trachea midline    LUNGS: Adequate and symmetric respiratory effort  No intercostal retractions or accessory muscle use  HEART: Extremities warm and perfused  ABDOMEN: Nondistended  SKIN: Warm and dry, no rash  Left  Hip Exam  Extension: 0  Flexion: 120  Internal/External Rotation: 5/20   Leg is noted to be shorter than other leg   - leg length is about 1 5cm shorter compared to contralateral   We did use blocks in the office today and the patient felt equal with 15mm block under the left foot  Sensation Intact to Light Touch in Sural, Saphenous, Tibial, Superficial Peroneal, and Deep Peroneal Nerve Distribution  Motor function not intact in Tibialis Anterior, Gastrocnemius, Soleus, Extensor Hallucis Longus, and Flexor Hallucis Longus Muscles  (Patient reports that this is baseline due to CP)  Extremity Warm and Well Perfused  Imaging  A) Imaging modality available  Radiographs: yes  MRI scan: yes  CT scan: no    B) Imaging findings  Subchondral cysts: no  Subchondral sclerosis: yes  Periarticular osteophytes: no  Joint subluxation: no  Joint space narrowing: yes  Bone-on-bone articulations: no  Avascular necrosis: no    CAM deformity    MRI demonsrtates labral tear with significant damage to articular cartilage  Assessment and Plan  Left Hip CAM Deformity  Left Hip Osteoarthritis      - patient has attempted PO meds (NSAIDs and tylenol), CSI, activity modification with little relief of pain  Recommend trying PT  Recommend L DAA MIREYA  We discussed the role of total joint replacement  All non-surgical and surgical treatment options were discussed  I reviewed the procedure in detail  All of the risks, benefits, pro's and con's of total joint arthroplasty were discussed    All complications were discussed pre-operatively including but not limited to pain, infection, scar, stiffness, bleeding, blood loss, need for transfusion, neurovascular injury, implant failure, fracture, DVT, PE, MI, CVA, limb length inequality, dislocation, loss of limb, loss of life, need for additional surgery  All of the patient's questions were answered  2   DVT Prophylaxis: Patient is at normal risk for VTE  We will plan on using early ambulation with mechanical squeezers and BID dosing of aspirin    3  Antimicrobial Prophylaxis: The patient will be given Ancef perioperatively to decrease the risk of infection    4  Risk of bleeding: We will use TXA perioperatively to minimize the risk of bleeding  5   Cardiovascular: The patient is not on a beta blocker    6  Discharge Planning:  The planned discharge is to home   - patient is interested in possible same day discharge    7  Medical Conditions: htn (130-140s)  - preop labs and medical risk stratifcation completed          Alvin Johnson MD  Adult Reconstruction Surgery  Department of [place] [id number]  10:34 AM

## 2021-02-19 NOTE — PRE-PROCEDURE INSTRUCTIONS
Pre-Surgery Instructions:   Medication Instructions    ascorbic acid (VITAMIN C) 500 MG tablet Instructed patient per Anesthesia Guidelines   buPROPion (WELLBUTRIN SR) 150 mg 12 hr tablet Instructed patient per Anesthesia Guidelines   ferrous sulfate 324 (65 Fe) mg Instructed patient per Anesthesia Guidelines   folic acid (FOLVITE) 1 mg tablet Instructed patient per Anesthesia Guidelines   LORazepam (ATIVAN) 1 mg tablet Instructed patient per Anesthesia Guidelines   Multiple Vitamins-Minerals (multivitamin with minerals) tablet Instructed patient per Anesthesia Guidelines   NON FORMULARY Instructed patient per Anesthesia Guidelines  Pt instructed to take wellbutrin the morning of surgery with a small sip of water  [facility] preop instructions reviewed with pt  Joint information reviewed  Incentive spirometer reviewed

## 2021-02-19 NOTE — PROGRESS NOTES
Orthopaedic Surgery Note    CC: Left Hip Pain      HPI:  Rebeca Hidalgo is a 46 y o male who presents with left hip pain  Updated history:  Patient returns and has now done 5 sessions of formal PT and also reports he has been diligently doing the HEP he was instructed in  He reports that all of this is making his hip pain worse  He is having increased pain with these exercises  He is frustrated  He would like to proceed with DAA MIREYA  Updated history:  Mr Neftaly Solano returns for repeat evaluation of his left hip pain  This pain has persisted and is worsening over time  He has had CSI that provided only 50% relief and he states that the pain continues to limit his work, ADLs, and ability to walk  He has tried alleve and tylenol and these do not provide signficant relief  He has not yet tried therapy as he felt this would make things worse - we will ask him to go to PT but I do not think this will help  If PT does provide pain relief we will cancel his surgery  We do need to wait until first week in March at earliest due to [facility]  Of note, he does have CP and reports that he has always had a limp and he does not have motor control of the left foot and ankle  Previous history:  Patient has for the past 3 weeks, he has had left deep hip pain and buttock pain  Patient does not know any traumatic or inciting events but notes the pain just came on  He describes the pain as a 7/10 moderate pain that is aching and sharp and causes stiffness  PA and is worse with standing and walking and better with rest   It does interfere with his activities of daily living and wakes him up at night  He has tried NSAIDs, ice, and heat which have not fully help  He has never had this pain before          ALLERGIES:  No Known Allergies    CURRENT MEDICATIONS:  Current Outpatient Medications   Medication Sig Dispense Refill    ascorbic acid (VITAMIN C) 500 MG tablet Take 1 tablet (500 mg total) by mouth 2 (two) times a day 60 tablet 0    buPROPion (WELLBUTRIN SR) 150 mg 12 hr tablet Take 1 tablet (150 mg total) by mouth 2 (two) times a day 60 tablet 5    ferrous sulfate 324 (65 Fe) mg Take 1 tablet (324 mg total) by mouth 2 (two) times a day before meals 60 tablet 0    folic acid (FOLVITE) 1 mg tablet Take 1 tablet (1 mg total) by mouth daily 30 tablet 0    lisinopril (ZESTRIL) 20 mg tablet Take 1 tablet (20 mg total) by mouth daily 30 tablet 5    LORazepam (ATIVAN) 1 mg tablet Take 1 tablet (1 mg total) by mouth every 8 (eight) hours as needed for anxiety 90 tablet 5    Multiple Vitamins-Minerals (multivitamin with minerals) tablet Take 1 tablet by mouth daily 30 tablet 0    gabapentin (NEURONTIN) 300 mg capsule Take 1 capsule (300 mg total) by mouth daily at bedtime (Patient not taking: Reported on 2/2/2021) 30 capsule 1    HYDROcodone-acetaminophen (NORCO) 5-325 mg per tablet Take 1 tablet by mouth every 6 (six) hours as needed for painMax Daily Amount: 4 tablets (Patient not taking: Reported on 2/2/2021) 30 tablet 0    naproxen (NAPROSYN) 500 mg tablet Take 1 tablet (500 mg total) by mouth 2 (two) times a day with meals (Patient not taking: Reported on 2/2/2021) 30 tablet 5     No current facility-administered medications for this visit          PAST MEDICAL HISTORY  Past Medical History:   Diagnosis Date    Allergic     seasonal    Anxiety     Depression        SURGICAL HISTORY  Past Surgical History:   Procedure Laterality Date    FL INJECTION LEFT HIP (ARTHROGRAM)  1/12/2021    FOOT CAPSULE RELEASE W/ PERCUTANEOUS HEEL CORD LENGTHENING, TIBIAL TENDON TRANSFER      HAND SURGERY      HERNIA REPAIR         FAMILY HISTORY  Family History   Problem Relation Age of Onset    Diabetes Father     Hypertension Father        SOCIAL HISTORY  Social History     Socioeconomic History    Marital status: /Civil Union     Spouse name: Not on file    Number of children: Not on file    Years of education: Not on file    Highest education level: Not on file   Occupational History    Not on file   Social Needs    Financial resource strain: Not on file    Food insecurity     Worry: Not on file     Inability: Not on file    Transportation needs     Medical: Not on file     Non-medical: Not on file   Tobacco Use    Smoking status: Current Every Day Smoker     Packs/day: 0 50     Years: 35 00     Pack years: 17 50     Types: Cigarettes    Smokeless tobacco: Never Used   Substance and Sexual Activity    Alcohol use: Not Currently    Drug use: No    Sexual activity: Yes     Partners: Male, Female   Lifestyle    Physical activity     Days per week: Not on file     Minutes per session: Not on file    Stress: Not on file   Relationships    Social connections     Talks on phone: Not on file     Gets together: Not on file     Attends Yarsani service: Not on file     Active member of club or organization: Not on file     Attends meetings of clubs or organizations: Not on file     Relationship status: Not on file    Intimate partner violence     Fear of current or ex partner: Not on file     Emotionally abused: Not on file     Physically abused: Not on file     Forced sexual activity: Not on file   Other Topics Concern    Not on file   Social History Narrative    Not on file         Review of Systems   Metal Allergy: no  History of MRSA:no  History of DVT or PE:no  Active dental issues:no  Anesthesia complications:no    Patient indicated positive for vision problems, joint pain, back pain, and anxiety  Otherwise negative except per above and HPI  Physical Exam    Vitals  Vitals:    02/19/21 1011   BP: 128/70       BMI  Body mass index is 27 81 kg/m²  GENERAL: No acute distress  Alert and oriented  Well nourished and well hydrated  Appears stated age  HEENT : Normocephalic, atraumatic  Extraocular movements intact  Mask in place  NECK: Supple, trachea midline    LUNGS: Adequate and symmetric respiratory effort  No intercostal retractions or accessory muscle use  HEART: Extremities warm and perfused  ABDOMEN: Nondistended  SKIN: Warm and dry, no rash  Left  Hip Exam  Extension: 0  Flexion: 120  Internal/External Rotation: 5/20   Leg is noted to be shorter than other leg   - leg length is about 1 5cm shorter compared to contralateral   We did use blocks in the office today and the patient felt equal with 15mm block under the left foot  Sensation Intact to Light Touch in Sural, Saphenous, Tibial, Superficial Peroneal, and Deep Peroneal Nerve Distribution  Motor function not intact in Tibialis Anterior, Gastrocnemius, Soleus, Extensor Hallucis Longus, and Flexor Hallucis Longus Muscles  (Patient reports that this is baseline due to CP)  Extremity Warm and Well Perfused  Imaging  A) Imaging modality available  Radiographs: yes  MRI scan: yes  CT scan: no    B) Imaging findings  Subchondral cysts: no  Subchondral sclerosis: yes  Periarticular osteophytes: no  Joint subluxation: no  Joint space narrowing: yes  Bone-on-bone articulations: no  Avascular necrosis: no    CAM deformity    MRI demonsrtates labral tear with significant damage to articular cartilage  Assessment and Plan  Left Hip CAM Deformity  Left Hip Osteoarthritis      - patient has attempted PO meds (NSAIDs and tylenol), CSI, activity modification with little relief of pain  Recommend trying PT  Recommend L DAA MIREYA  We discussed the role of total joint replacement  All non-surgical and surgical treatment options were discussed  I reviewed the procedure in detail  All of the risks, benefits, pro's and con's of total joint arthroplasty were discussed    All complications were discussed pre-operatively including but not limited to pain, infection, scar, stiffness, bleeding, blood loss, need for transfusion, neurovascular injury, implant failure, fracture, DVT, PE, MI, CVA, limb length inequality, dislocation, loss of limb, loss of life, need for additional surgery  All of the patient's questions were answered  2   DVT Prophylaxis: Patient is at normal risk for VTE  We will plan on using early ambulation with mechanical squeezers and BID dosing of aspirin    3  Antimicrobial Prophylaxis: The patient will be given Ancef perioperatively to decrease the risk of infection    4  Risk of bleeding: We will use TXA perioperatively to minimize the risk of bleeding  5   Cardiovascular: The patient is not on a beta blocker    6  Discharge Planning:  The planned discharge is to home   - patient is interested in possible same day discharge    7  Medical Conditions: htn (130-140s)  - preop labs and medical risk stratifcation completed          Alvin Richardson MD  Adult Reconstruction Surgery  Department [address]  10:34 AM

## 2021-02-19 NOTE — TELEPHONE ENCOUNTER
Valid  Referral #: [id number]  Effective: 02/19/2021  Expires: 05/19/2021    Referral [id number] has been successfully submitted  [facility]   [place] [address]   [address] [id number]   PATIENT'S INSURANCE  Member ID: [id number]  PRIMARY CARE PHYSICIAN  [facility]   NPI: [id number]   From  [address] [facility],[address] [facility]   NPI: [id number]  [address] [facility]   [address]   [place], [address]   To  [place] [address]  NPI: [id number]  [address] [facility], [address], [place], [place] [address], [place]   ,   Service Type: Medical Care (Consult and Treat)   Place of Service: Office   Note to Patient   This referral is valid at any location for the above group       Clinical Information    Diagnoses (1)     Diagnosis    1  M25 552 - Pain in left hip      Procedures (1)     Procedure    1  [id number]

## 2021-02-19 NOTE — PROGRESS NOTES
[facility]        NAME: Darion Corea is a 46 y o  male  : 1970    MRN: [id number]  DATE: 2021  TIME: 7:34 AM    Assessment and Plan   Preop examination [Z01 818]  1  Preop examination  CANCELED: POCT ECG   2  Arthritis of left hip     3  Anxiety           Patient Instructions     Patient Instructions   Labs rev--LFTs elevated slightly--chronic--remainder labs NL--clear for surg --EKG NL          Chief Complaint     Chief Complaint   Patient presents with    Physical Exam    Pre-op Clearance         History of Present Illness       Pre-op for L hip replacement      Review of Systems   Review of Systems   Constitutional: Negative for fatigue, fever and unexpected weight change  HENT: Negative for congestion, sinus pain and sore throat  Eyes: Negative for visual disturbance  Respiratory: Negative for shortness of breath and wheezing  Cardiovascular: Negative for chest pain and palpitations  Gastrointestinal: Negative for abdominal pain, nausea and vomiting  Musculoskeletal: Negative  Negative for arthralgias and myalgias  Neurological: Negative for syncope, weakness and numbness  Psychiatric/Behavioral: Negative  Negative for confusion, dysphoric mood and suicidal ideas           Current Medications       Current Outpatient Medications:     ascorbic acid (VITAMIN C) 500 MG tablet, Take 1 tablet (500 mg total) by mouth 2 (two) times a day, Disp: 60 tablet, Rfl: 0    buPROPion (WELLBUTRIN SR) 150 mg 12 hr tablet, Take 1 tablet (150 mg total) by mouth 2 (two) times a day, Disp: 60 tablet, Rfl: 5    ferrous sulfate 324 (65 Fe) mg, Take 1 tablet (324 mg total) by mouth 2 (two) times a day before meals, Disp: 60 tablet, Rfl: 0    folic acid (FOLVITE) 1 mg tablet, Take 1 tablet (1 mg total) by mouth daily, Disp: 30 tablet, Rfl: 0    LORazepam (ATIVAN) 1 mg tablet, Take 1 tablet (1 mg total) by mouth every 8 (eight) hours as needed for anxiety, Disp: 90 tablet, Rfl: 5    Multiple Vitamins-Minerals (multivitamin with minerals) tablet, Take 1 tablet by mouth daily, Disp: 30 tablet, Rfl: 0    NON FORMULARY, Medical marijuana, Disp: , Rfl:     Current Allergies     Allergies as of 02/19/2021    (No Known Allergies)            The following portions of the patient's history were reviewed and updated as appropriate: allergies, current medications, past family history, past medical history, past social history, past surgical history and problem list      Past Medical History:   Diagnosis Date    Anxiety     Arthritis     Back pain     Chronic pain disorder     Claustrophobia     Depression     Hypertension     Wears glasses        Past Surgical History:   Procedure Laterality Date    FL INJECTION LEFT HIP (ARTHROGRAM)  1/12/2021    FOOT CAPSULE RELEASE W/ PERCUTANEOUS HEEL CORD LENGTHENING, TIBIAL TENDON TRANSFER Left     HAND SURGERY Right     tedon repair    HERNIA REPAIR Right        Family History   Problem Relation Age of Onset    Diabetes Father     Hypertension Father          Medications have been verified  Objective   Pulse (!) 114   Temp 98 3 °F (36 8 °C) (Temporal)   Ht 5' 4" (1 626 m)   Wt 73 9 kg (163 lb)   SpO2 95%   BMI 27 98 kg/m²        Physical Exam     Physical Exam  Constitutional:       Appearance: He is well-developed  HENT:      Right Ear: Ear canal normal  Tympanic membrane is not injected  Left Ear: Ear canal normal  Tympanic membrane is not injected  Nose: Nose normal    Eyes:      General:         Right eye: No discharge  Left eye: No discharge  Conjunctiva/sclera: Conjunctivae normal       Pupils: Pupils are equal, round, and reactive to light  Neck:      Musculoskeletal: Normal range of motion and neck supple  Thyroid: No thyromegaly  Cardiovascular:      Rate and Rhythm: Normal rate and regular rhythm  Heart sounds: Normal heart sounds  No murmur     Pulmonary:      Effort: Pulmonary effort is normal  No respiratory distress  Breath sounds: Normal breath sounds  No wheezing  Abdominal:      General: Bowel sounds are normal  There is no distension  Palpations: Abdomen is soft  Tenderness: There is no abdominal tenderness  Musculoskeletal: Normal range of motion  Lymphadenopathy:      Cervical: No cervical adenopathy  Skin:     General: Skin is warm and dry  Neurological:      Mental Status: He is alert and oriented to person, place, and time  He is not disoriented  Sensory: No sensory deficit  Gait: Gait normal       Deep Tendon Reflexes: Reflexes are normal and symmetric  Psychiatric:         Speech: Speech normal          Behavior: Behavior normal          Thought Content:  Thought content normal          Judgment: Judgment normal

## 2021-02-23 ENCOUNTER — HOSPITAL ENCOUNTER (OUTPATIENT)
Dept: NON INVASIVE DIAGNOSTICS | Age: 51
Discharge: HOME | End: 2021-02-23
Payer: COMMERCIAL

## 2021-02-23 ENCOUNTER — TELEPHONE (OUTPATIENT)
Dept: OBGYN CLINIC | Facility: HOSPITAL | Age: 51
End: 2021-02-23

## 2021-02-23 ENCOUNTER — APPOINTMENT (OUTPATIENT)
Dept: PHYSICAL THERAPY | Facility: CLINIC | Age: 51
End: 2021-02-23
Payer: COMMERCIAL

## 2021-02-23 DIAGNOSIS — Z01.818 PREOP TESTING: ICD-10-CM

## 2021-02-23 DIAGNOSIS — Z01.812 PRE-OPERATIVE LABORATORY EXAMINATION: ICD-10-CM

## 2021-02-23 DIAGNOSIS — M16.12 PRIMARY OSTEOARTHRITIS OF LEFT HIP: ICD-10-CM

## 2021-02-23 LAB — SARS-COV-2 RNA RESP QL NAA+PROBE: NEGATIVE

## 2021-02-23 PROCEDURE — U0005 INFEC AGEN DETEC AMPLI PROBE: HCPCS

## 2021-02-23 PROCEDURE — 93005 ELECTROCARDIOGRAM TRACING: CPT

## 2021-02-23 PROCEDURE — U0003 INFECTIOUS AGENT DETECTION BY NUCLEIC ACID (DNA OR RNA); SEVERE ACUTE RESPIRATORY SYNDROME CORONAVIRUS 2 (SARS-COV-2) (CORONAVIRUS DISEASE [COVID-19]), AMPLIFIED PROBE TECHNIQUE, MAKING USE OF HIGH THROUGHPUT TECHNOLOGIES AS DESCRIBED BY CMS-2020-01-R: HCPCS

## 2021-02-24 LAB
ATRIAL RATE: 95 BPM
P AXIS: 65 DEGREES
PR INTERVAL: 124 MS
QRS AXIS: -26 DEGREES
QRSD INTERVAL: 74 MS
QT INTERVAL: 352 MS
QTC INTERVAL: 442 MS
T WAVE AXIS: 35 DEGREES
VENTRICULAR RATE: 95 BPM

## 2021-02-24 PROCEDURE — 93010 ELECTROCARDIOGRAM REPORT: CPT | Performed by: INTERNAL MEDICINE

## 2021-02-25 ENCOUNTER — APPOINTMENT (OUTPATIENT)
Dept: PHYSICAL THERAPY | Facility: CLINIC | Age: 51
End: 2021-02-25
Payer: COMMERCIAL

## 2021-03-03 ENCOUNTER — APPOINTMENT (OUTPATIENT)
Dept: RADIOLOGY | Facility: HOSPITAL | Age: 51
DRG: 470 | End: 2021-03-03
Payer: COMMERCIAL

## 2021-03-03 ENCOUNTER — ANESTHESIA EVENT (OUTPATIENT)
Dept: PERIOP | Facility: HOSPITAL | Age: 51
DRG: 470 | End: 2021-03-03
Payer: COMMERCIAL

## 2021-03-03 ENCOUNTER — APPOINTMENT (INPATIENT)
Dept: RADIOLOGY | Facility: HOSPITAL | Age: 51
DRG: 470 | End: 2021-03-03
Payer: COMMERCIAL

## 2021-03-03 ENCOUNTER — HOSPITAL ENCOUNTER (INPATIENT)
Facility: HOSPITAL | Age: 51
LOS: 1 days | Discharge: HOME | DRG: 470 | End: 2021-03-03
Attending: ORTHOPAEDIC SURGERY | Admitting: ORTHOPAEDIC SURGERY
Payer: COMMERCIAL

## 2021-03-03 ENCOUNTER — ANESTHESIA (OUTPATIENT)
Dept: PERIOP | Facility: HOSPITAL | Age: 51
DRG: 470 | End: 2021-03-03
Payer: COMMERCIAL

## 2021-03-03 VITALS
TEMPERATURE: 97.9 F | SYSTOLIC BLOOD PRESSURE: 129 MMHG | BODY MASS INDEX: 27.54 KG/M2 | HEART RATE: 86 BPM | OXYGEN SATURATION: 96 % | RESPIRATION RATE: 19 BRPM | HEIGHT: 64 IN | WEIGHT: 161.3 LBS | DIASTOLIC BLOOD PRESSURE: 90 MMHG

## 2021-03-03 DIAGNOSIS — M16.12 ARTHRITIS OF LEFT HIP: Primary | ICD-10-CM

## 2021-03-03 LAB
ABO GROUP BLD: NORMAL
ABO GROUP BLD: NORMAL
BLD GP AB SCN SERPL QL: NEGATIVE
HCT VFR BLD AUTO: 48 % (ref 36.5–49.3)
HGB BLD-MCNC: 16.1 G/DL (ref 12–17)
RH BLD: POSITIVE
RH BLD: POSITIVE
SPECIMEN EXPIRATION DATE: NORMAL

## 2021-03-03 PROCEDURE — 86900 BLOOD TYPING SEROLOGIC ABO: CPT | Performed by: ORTHOPAEDIC SURGERY

## 2021-03-03 PROCEDURE — 97116 GAIT TRAINING THERAPY: CPT

## 2021-03-03 PROCEDURE — C1776 JOINT DEVICE (IMPLANTABLE): HCPCS | Performed by: ORTHOPAEDIC SURGERY

## 2021-03-03 PROCEDURE — 73502 X-RAY EXAM HIP UNI 2-3 VIEWS: CPT

## 2021-03-03 PROCEDURE — 97535 SELF CARE MNGMENT TRAINING: CPT

## 2021-03-03 PROCEDURE — 97163 PT EVAL HIGH COMPLEX 45 MIN: CPT

## 2021-03-03 PROCEDURE — 86901 BLOOD TYPING SEROLOGIC RH(D): CPT | Performed by: ORTHOPAEDIC SURGERY

## 2021-03-03 PROCEDURE — 27130 TOTAL HIP ARTHROPLASTY: CPT | Performed by: ORTHOPAEDIC SURGERY

## 2021-03-03 PROCEDURE — 0SRB04A REPLACEMENT OF LEFT HIP JOINT WITH CERAMIC ON POLYETHYLENE SYNTHETIC SUBSTITUTE, UNCEMENTED, OPEN APPROACH: ICD-10-PCS | Performed by: ORTHOPAEDIC SURGERY

## 2021-03-03 PROCEDURE — 73501 X-RAY EXAM HIP UNI 1 VIEW: CPT

## 2021-03-03 PROCEDURE — 85018 HEMOGLOBIN: CPT | Performed by: PHYSICIAN ASSISTANT

## 2021-03-03 PROCEDURE — 85014 HEMATOCRIT: CPT | Performed by: PHYSICIAN ASSISTANT

## 2021-03-03 PROCEDURE — 97167 OT EVAL HIGH COMPLEX 60 MIN: CPT

## 2021-03-03 PROCEDURE — C1713 ANCHOR/SCREW BN/BN,TIS/BN: HCPCS | Performed by: ORTHOPAEDIC SURGERY

## 2021-03-03 PROCEDURE — 86850 RBC ANTIBODY SCREEN: CPT | Performed by: ORTHOPAEDIC SURGERY

## 2021-03-03 DEVICE — PINNACLE GRIPTION ACETABULAR SHELL SECTOR 52MM OD
Type: IMPLANTABLE DEVICE | Site: HIP | Status: FUNCTIONAL
Brand: PINNACLE GRIPTION

## 2021-03-03 DEVICE — PINNACLE CANCELLOUS BONE SCREW 6.5MM X 30MM
Type: IMPLANTABLE DEVICE | Site: HIP | Status: FUNCTIONAL
Brand: PINNACLE

## 2021-03-03 DEVICE — PINNACLE HIP SOLUTIONS ALTRX POLYETHYLENE ACETABULAR LINER +4 NEUTRAL 36MM ID 52MM OD
Type: IMPLANTABLE DEVICE | Site: HIP | Status: FUNCTIONAL
Brand: PINNACLE ALTRX

## 2021-03-03 DEVICE — BIOLOX DELTA CERAMIC FEMORAL HEAD +5.0 36MM DIA 12/14 TAPER
Type: IMPLANTABLE DEVICE | Site: HIP | Status: FUNCTIONAL
Brand: BIOLOX DELTA

## 2021-03-03 DEVICE — ACTIS DUOFIX HIP PROSTHESIS (FEMORAL STEM 12/14 TAPER CEMENTLESS SIZE 6 HIGH COLLAR)  CE
Type: IMPLANTABLE DEVICE | Site: HIP | Status: FUNCTIONAL
Brand: ACTIS

## 2021-03-03 RX ORDER — MIDAZOLAM HYDROCHLORIDE 2 MG/2ML
INJECTION, SOLUTION INTRAMUSCULAR; INTRAVENOUS AS NEEDED
Status: DISCONTINUED | OUTPATIENT
Start: 2021-03-03 | End: 2021-03-03

## 2021-03-03 RX ORDER — MAGNESIUM HYDROXIDE 1200 MG/15ML
LIQUID ORAL AS NEEDED
Status: DISCONTINUED | OUTPATIENT
Start: 2021-03-03 | End: 2021-03-03 | Stop reason: HOSPADM

## 2021-03-03 RX ORDER — EPHEDRINE SULFATE 50 MG/ML
INJECTION INTRAVENOUS AS NEEDED
Status: DISCONTINUED | OUTPATIENT
Start: 2021-03-03 | End: 2021-03-03

## 2021-03-03 RX ORDER — ONDANSETRON 4 MG/1
4 TABLET, ORALLY DISINTEGRATING ORAL EVERY 6 HOURS PRN
Qty: 20 TABLET | Refills: 0 | Status: SHIPPED | OUTPATIENT
Start: 2021-03-03 | End: 2021-03-22

## 2021-03-03 RX ORDER — CALCIUM CARBONATE 200(500)MG
1000 TABLET,CHEWABLE ORAL DAILY PRN
Status: DISCONTINUED | OUTPATIENT
Start: 2021-03-03 | End: 2021-03-03 | Stop reason: HOSPADM

## 2021-03-03 RX ORDER — CEFAZOLIN SODIUM 2 G/50ML
2000 SOLUTION INTRAVENOUS ONCE
Status: COMPLETED | OUTPATIENT
Start: 2021-03-03 | End: 2021-03-03

## 2021-03-03 RX ORDER — OXYCODONE HYDROCHLORIDE 5 MG/1
5 TABLET ORAL EVERY 4 HOURS PRN
Qty: 30 TABLET | Refills: 0 | Status: SHIPPED | OUTPATIENT
Start: 2021-03-03 | End: 2021-03-09 | Stop reason: ALTCHOICE

## 2021-03-03 RX ORDER — CEFAZOLIN SODIUM 2 G/50ML
2000 SOLUTION INTRAVENOUS EVERY 8 HOURS
Status: DISCONTINUED | OUTPATIENT
Start: 2021-03-03 | End: 2021-03-03 | Stop reason: HOSPADM

## 2021-03-03 RX ORDER — HYDROMORPHONE HCL/PF 1 MG/ML
0.5 SYRINGE (ML) INJECTION EVERY 2 HOUR PRN
Status: DISCONTINUED | OUTPATIENT
Start: 2021-03-03 | End: 2021-03-03 | Stop reason: HOSPADM

## 2021-03-03 RX ORDER — ACETAMINOPHEN 325 MG/1
650 TABLET ORAL EVERY 6 HOURS PRN
Status: DISCONTINUED | OUTPATIENT
Start: 2021-03-03 | End: 2021-03-03 | Stop reason: HOSPADM

## 2021-03-03 RX ORDER — SODIUM CHLORIDE, SODIUM LACTATE, POTASSIUM CHLORIDE, CALCIUM CHLORIDE 600; 310; 30; 20 MG/100ML; MG/100ML; MG/100ML; MG/100ML
1.5 INJECTION, SOLUTION INTRAVENOUS CONTINUOUS
Status: DISCONTINUED | OUTPATIENT
Start: 2021-03-03 | End: 2021-03-03 | Stop reason: HOSPADM

## 2021-03-03 RX ORDER — BUPIVACAINE HYDROCHLORIDE 7.5 MG/ML
INJECTION, SOLUTION INTRASPINAL AS NEEDED
Status: DISCONTINUED | OUTPATIENT
Start: 2021-03-03 | End: 2021-03-03

## 2021-03-03 RX ORDER — METHOCARBAMOL 500 MG/1
500 TABLET, FILM COATED ORAL EVERY 6 HOURS PRN
Qty: 30 TABLET | Refills: 0 | Status: SHIPPED | OUTPATIENT
Start: 2021-03-03 | End: 2021-03-22

## 2021-03-03 RX ORDER — ASPIRIN 81 MG/1
81 TABLET ORAL 2 TIMES DAILY
Status: DISCONTINUED | OUTPATIENT
Start: 2021-03-03 | End: 2021-03-03 | Stop reason: HOSPADM

## 2021-03-03 RX ORDER — SENNOSIDES 8.6 MG
8.6 TABLET ORAL DAILY
Qty: 90 TABLET | Refills: 0 | Status: SHIPPED | OUTPATIENT
Start: 2021-03-04 | End: 2021-03-22

## 2021-03-03 RX ORDER — ASPIRIN 81 MG/1
81 TABLET ORAL 2 TIMES DAILY
Qty: 90 TABLET | Refills: 0 | Status: SHIPPED | OUTPATIENT
Start: 2021-03-03 | End: 2022-04-06

## 2021-03-03 RX ORDER — SENNOSIDES 8.6 MG
1 TABLET ORAL DAILY
Status: DISCONTINUED | OUTPATIENT
Start: 2021-03-03 | End: 2021-03-03 | Stop reason: HOSPADM

## 2021-03-03 RX ORDER — BUPROPION HYDROCHLORIDE 150 MG/1
150 TABLET, EXTENDED RELEASE ORAL 2 TIMES DAILY
Status: DISCONTINUED | OUTPATIENT
Start: 2021-03-03 | End: 2021-03-03 | Stop reason: HOSPADM

## 2021-03-03 RX ORDER — CHLORHEXIDINE GLUCONATE 0.12 MG/ML
15 RINSE ORAL ONCE
Status: COMPLETED | OUTPATIENT
Start: 2021-03-03 | End: 2021-03-03

## 2021-03-03 RX ORDER — CELECOXIB 100 MG/1
100 CAPSULE ORAL 2 TIMES DAILY
Status: DISCONTINUED | OUTPATIENT
Start: 2021-03-03 | End: 2021-03-03 | Stop reason: HOSPADM

## 2021-03-03 RX ORDER — PROPOFOL 10 MG/ML
INJECTION, EMULSION INTRAVENOUS CONTINUOUS PRN
Status: DISCONTINUED | OUTPATIENT
Start: 2021-03-03 | End: 2021-03-03

## 2021-03-03 RX ORDER — ASPIRIN 81 MG/1
81 TABLET ORAL DAILY
Qty: 90 TABLET | Refills: 0 | Status: SHIPPED | OUTPATIENT
Start: 2021-03-03 | End: 2021-03-03 | Stop reason: SDUPTHER

## 2021-03-03 RX ORDER — CHLORHEXIDINE GLUCONATE 4 G/100ML
SOLUTION TOPICAL DAILY PRN
Status: DISCONTINUED | OUTPATIENT
Start: 2021-03-03 | End: 2021-03-03

## 2021-03-03 RX ORDER — OXYCODONE HYDROCHLORIDE 5 MG/1
10 TABLET ORAL EVERY 4 HOURS PRN
Status: DISCONTINUED | OUTPATIENT
Start: 2021-03-03 | End: 2021-03-03 | Stop reason: HOSPADM

## 2021-03-03 RX ORDER — HYDROMORPHONE HCL/PF 1 MG/ML
0.5 SYRINGE (ML) INJECTION
Status: DISCONTINUED | OUTPATIENT
Start: 2021-03-03 | End: 2021-03-03 | Stop reason: HOSPADM

## 2021-03-03 RX ORDER — LORAZEPAM 1 MG/1
1 TABLET ORAL EVERY 8 HOURS PRN
Status: DISCONTINUED | OUTPATIENT
Start: 2021-03-03 | End: 2021-03-03 | Stop reason: HOSPADM

## 2021-03-03 RX ORDER — BUPROPION HYDROCHLORIDE 300 MG/1
300 TABLET ORAL DAILY
Status: DISCONTINUED | OUTPATIENT
Start: 2021-03-03 | End: 2021-03-03

## 2021-03-03 RX ORDER — PREGABALIN 75 MG/1
75 CAPSULE ORAL 3 TIMES DAILY
Qty: 30 CAPSULE | Refills: 1 | Status: SHIPPED | OUTPATIENT
Start: 2021-03-03 | End: 2021-03-22

## 2021-03-03 RX ORDER — OXYCODONE HYDROCHLORIDE 5 MG/1
5 TABLET ORAL EVERY 4 HOURS PRN
Status: DISCONTINUED | OUTPATIENT
Start: 2021-03-03 | End: 2021-03-03 | Stop reason: HOSPADM

## 2021-03-03 RX ORDER — PREGABALIN 25 MG/1
75 CAPSULE ORAL 2 TIMES DAILY
Status: DISCONTINUED | OUTPATIENT
Start: 2021-03-03 | End: 2021-03-03 | Stop reason: HOSPADM

## 2021-03-03 RX ORDER — CELECOXIB 100 MG/1
100 CAPSULE ORAL 2 TIMES DAILY
Qty: 30 CAPSULE | Refills: 1 | Status: SHIPPED | OUTPATIENT
Start: 2021-03-03 | End: 2021-03-12

## 2021-03-03 RX ORDER — ONDANSETRON 2 MG/ML
4 INJECTION INTRAMUSCULAR; INTRAVENOUS EVERY 6 HOURS PRN
Status: DISCONTINUED | OUTPATIENT
Start: 2021-03-03 | End: 2021-03-03 | Stop reason: HOSPADM

## 2021-03-03 RX ORDER — METHOCARBAMOL 500 MG/1
500 TABLET, FILM COATED ORAL EVERY 6 HOURS PRN
Status: DISCONTINUED | OUTPATIENT
Start: 2021-03-03 | End: 2021-03-03 | Stop reason: HOSPADM

## 2021-03-03 RX ORDER — FENTANYL CITRATE 50 UG/ML
INJECTION, SOLUTION INTRAMUSCULAR; INTRAVENOUS AS NEEDED
Status: DISCONTINUED | OUTPATIENT
Start: 2021-03-03 | End: 2021-03-03

## 2021-03-03 RX ORDER — SODIUM CHLORIDE, SODIUM LACTATE, POTASSIUM CHLORIDE, CALCIUM CHLORIDE 600; 310; 30; 20 MG/100ML; MG/100ML; MG/100ML; MG/100ML
INJECTION, SOLUTION INTRAVENOUS CONTINUOUS PRN
Status: DISCONTINUED | OUTPATIENT
Start: 2021-03-03 | End: 2021-03-03

## 2021-03-03 RX ADMIN — METHOCARBAMOL TABLETS 500 MG: 500 TABLET, COATED ORAL at 16:25

## 2021-03-03 RX ADMIN — MIDAZOLAM 1 MG: 1 INJECTION INTRAMUSCULAR; INTRAVENOUS at 10:57

## 2021-03-03 RX ADMIN — SODIUM CHLORIDE, SODIUM LACTATE, POTASSIUM CHLORIDE, AND CALCIUM CHLORIDE 1000 ML: .6; .31; .03; .02 INJECTION, SOLUTION INTRAVENOUS at 12:47

## 2021-03-03 RX ADMIN — CHLORHEXIDINE GLUCONATE 0.12% ORAL RINSE 15 ML: 1.2 LIQUID ORAL at 09:06

## 2021-03-03 RX ADMIN — FENTANYL CITRATE 50 MCG: 50 INJECTION, SOLUTION INTRAMUSCULAR; INTRAVENOUS at 10:40

## 2021-03-03 RX ADMIN — SODIUM CHLORIDE, SODIUM LACTATE, POTASSIUM CHLORIDE, AND CALCIUM CHLORIDE: .6; .31; .03; .02 INJECTION, SOLUTION INTRAVENOUS at 10:35

## 2021-03-03 RX ADMIN — CELECOXIB 100 MG: 100 CAPSULE ORAL at 17:16

## 2021-03-03 RX ADMIN — PROPOFOL 50 MCG/KG/MIN: 10 INJECTION, EMULSION INTRAVENOUS at 11:00

## 2021-03-03 RX ADMIN — EPHEDRINE SULFATE 10 MG: 50 INJECTION, SOLUTION INTRAVENOUS at 11:18

## 2021-03-03 RX ADMIN — MIDAZOLAM 2 MG: 1 INJECTION INTRAMUSCULAR; INTRAVENOUS at 10:35

## 2021-03-03 RX ADMIN — OXYCODONE HYDROCHLORIDE 10 MG: 5 TABLET ORAL at 14:29

## 2021-03-03 RX ADMIN — PREGABALIN 75 MG: 25 CAPSULE ORAL at 17:16

## 2021-03-03 RX ADMIN — MIDAZOLAM 1 MG: 1 INJECTION INTRAMUSCULAR; INTRAVENOUS at 11:00

## 2021-03-03 RX ADMIN — HYDROMORPHONE HYDROCHLORIDE 0.5 MG: 1 INJECTION, SOLUTION INTRAMUSCULAR; INTRAVENOUS; SUBCUTANEOUS at 13:13

## 2021-03-03 RX ADMIN — CEFAZOLIN SODIUM 2000 MG: 2 SOLUTION INTRAVENOUS at 10:35

## 2021-03-03 RX ADMIN — ASPIRIN 81 MG: 81 TABLET, COATED ORAL at 13:55

## 2021-03-03 RX ADMIN — BUPROPION HYDROCHLORIDE 150 MG: 150 TABLET, EXTENDED RELEASE ORAL at 17:16

## 2021-03-03 RX ADMIN — SENNOSIDES 8.6 MG: 8.6 TABLET, FILM COATED ORAL at 13:55

## 2021-03-03 RX ADMIN — TRANEXAMIC ACID 1000 MG: 1 INJECTION, SOLUTION INTRAVENOUS at 09:16

## 2021-03-03 RX ADMIN — BUPIVACAINE HYDROCHLORIDE IN DEXTROSE 1.4 ML: 7.5 INJECTION, SOLUTION SUBARACHNOID at 10:44

## 2021-03-03 RX ADMIN — FENTANYL CITRATE 50 MCG: 50 INJECTION, SOLUTION INTRAMUSCULAR; INTRAVENOUS at 10:35

## 2021-03-03 RX ADMIN — SODIUM CHLORIDE, SODIUM LACTATE, POTASSIUM CHLORIDE, AND CALCIUM CHLORIDE 1.5 ML/KG/HR: .6; .31; .03; .02 INJECTION, SOLUTION INTRAVENOUS at 13:56

## 2021-03-03 NOTE — PLAN OF CARE
Problem: OCCUPATIONAL THERAPY ADULT  Goal: Performs self-care activities at highest level of function for planned discharge setting  See evaluation for individualized goals  Description: Treatment Interventions: ADL retraining, Functional transfer training, Endurance training, Patient/family training, Equipment evaluation/education, Compensatory technique education          See flowsheet documentation for full assessment, interventions and recommendations  Outcome: Progressing  Note: Limitation: Decreased ADL status, Decreased Safe judgement during ADL, Decreased endurance, Decreased self-care trans, Decreased high-level ADLs  Prognosis: Good  Assessment: Pt is a 46 y o  male seen for OT evaluation s/p admit to [facility] on 3/3/2021 w/ Arthritis of left hip  Comorbidities affecting pt's functional performance at time of assessment include: anxiety, ETOH use disorder, mixed hyperlipidemia, left hip pain, essential HTN, chronic pain syndrome  Pt recieved post op day 0 after anterior MIREYA, WBAT, no precautions    Personal factors affecting pt at time of IE include:steps to enter environment, difficulty performing ADLS and difficulty performing IADLS   Prior to admission, pt was independent in ADLs, IADLs and functional mobility with no AD  Upon evaluation: Pt requires min A for functional mobility and mod A for donning socks 2* the following deficits impacting occupational performance: decreased ROM, decreased strength, decreased balance, decreased tolerance, increased pain and orthopedic restrictions  Pt to benefit from continued skilled OT tx while in the hospital to address deficits as defined above and maximize level of functional independence w ADL's and functional mobility  Occupational Performance areas to address include: bathing/shower, dressing, functional mobility and community mobility  From OT standpoint, recommendation at time of d/c would be home with family support and outpatient therapy       OT Discharge Recommendation: Home with skilled therapy

## 2021-03-03 NOTE — ANESTHESIA PROCEDURE NOTES
Spinal Block    Patient location during procedure: OR  Start time: 3/3/2021 10:44 AM  Reason for block: primary anesthetic  Staffing  Resident/CRNA: Jayashree Marin CRNA  Preanesthetic Checklist  Completed: patient identified, site marked, surgical consent, pre-op evaluation, timeout performed, IV checked, risks and benefits discussed and monitors and equipment checked  Spinal Block  Patient position: sitting  Prep: Betadine  Patient monitoring: continuous pulse ox and frequent blood pressure checks  Approach: midline  Location: L2-3  Injection technique: single-shot  Needle  Needle type: pencil-tip   Needle gauge: 25 G  Needle length: 5 cm  Assessment  Sensory level: T10  Injection Assessment:  negative aspiration for heme, no paresthesia on injection and positive aspiration for clear CSF    Post-procedure:  site cleaned  Additional Notes  2 attempta

## 2021-03-03 NOTE — PLAN OF CARE
Problem: PAIN - ADULT  Goal: Verbalizes/displays adequate comfort level or baseline comfort level  Description: Interventions:  - Encourage patient to monitor pain and request assistance  - Assess pain using appropriate pain scale  - Administer analgesics based on type and severity of pain and evaluate response  - Implement non-pharmacological measures as appropriate and evaluate response  - Consider cultural and social influences on pain and pain management  - Notify physician/advanced practitioner if interventions unsuccessful or patient reports new pain  Outcome: Progressing     Problem: INFECTION - ADULT  Goal: Absence or prevention of progression during hospitalization  Description: INTERVENTIONS:  - Assess and monitor for signs and symptoms of infection  - Monitor lab/diagnostic results  - Monitor all insertion sites, i e  indwelling lines, tubes, and drains  - Monitor endotracheal if appropriate and nasal secretions for changes in amount and color  - [place] appropriate cooling/warming therapies per order  - Administer medications as ordered  - Instruct and encourage patient and family to use good hand hygiene technique  - Identify and instruct in appropriate isolation precautions for identified infection/condition  Outcome: Progressing  Goal: Absence of fever/infection during neutropenic period  Description: INTERVENTIONS:  - Monitor WBC    Outcome: Progressing     Problem: SAFETY ADULT  Goal: Patient will remain free of falls  Description: INTERVENTIONS:  - Assess patient frequently for physical needs  -  Identify cognitive and physical deficits and behaviors that affect risk of falls    -  [place] fall precautions as indicated by assessment   - Educate patient/family on patient safety including physical limitations  - Instruct patient to call for assistance with activity based on assessment  - Modify environment to reduce risk of injury  - Consider OT/PT consult to assist with strengthening/mobility  Outcome: Progressing  Goal: Maintain or return to baseline ADL function  Description: INTERVENTIONS:  -  Assess patient's ability to carry out ADLs; assess patient's baseline for ADL function and identify physical deficits which impact ability to perform ADLs (bathing, care of mouth/teeth, toileting, grooming, dressing, etc )  - Assess/evaluate cause of self-care deficits   - Assess range of motion  - Assess patient's mobility; develop plan if impaired  - Assess patient's need for assistive devices and provide as appropriate  - Encourage maximum independence but intervene and supervise when necessary  - Involve family in performance of ADLs  - Assess for home care needs following discharge   - Consider OT consult to assist with ADL evaluation and planning for discharge  - Provide patient education as appropriate  Outcome: Progressing  Goal: Maintain or return mobility status to optimal level  Description: INTERVENTIONS:  - Assess patient's baseline mobility status (ambulation, transfers, stairs, etc )    - Identify cognitive and physical deficits and behaviors that affect mobility  - Identify mobility aids required to assist with transfers and/or ambulation (gait belt, sit-to-stand, lift, walker, cane, etc )  - [place] fall precautions as indicated by assessment  - Record patient progress and toleration of activity level on Mobility SBAR; progress patient to next Phase/Stage  - Instruct patient to call for assistance with activity based on assessment  - Consider rehabilitation consult to assist with strengthening/weightbearing, etc   Outcome: Progressing     Problem: DISCHARGE PLANNING  Goal: Discharge to home or other facility with appropriate resources  Description: INTERVENTIONS:  - Identify barriers to discharge w/patient and caregiver  - Arrange for needed discharge resources and transportation as appropriate  - Identify discharge learning needs (meds, wound care, etc )  - Arrange for interpretive services to assist at discharge as needed  - Refer to Case Management Department for coordinating discharge planning if the patient needs post-hospital services based on physician/advanced practitioner order or complex needs related to functional status, cognitive ability, or social support system  Outcome: Progressing     Problem: Knowledge Deficit  Goal: Patient/family/caregiver demonstrates understanding of disease process, treatment plan, medications, and discharge instructions  Description: Complete learning assessment and assess knowledge base    Interventions:  - Provide teaching at level of understanding  - Provide teaching via preferred learning methods  Outcome: Progressing     Problem: MUSCULOSKELETAL - ADULT  Goal: Maintain or return mobility to safest level of function  Description: INTERVENTIONS:  - Assess patient's ability to carry out ADLs; assess patient's baseline for ADL function and identify physical deficits which impact ability to perform ADLs (bathing, care of mouth/teeth, toileting, grooming, dressing, etc )  - Assess/evaluate cause of self-care deficits   - Assess range of motion  - Assess patient's mobility  - Assess patient's need for assistive devices and provide as appropriate  - Encourage maximum independence but intervene and supervise when necessary  - Involve family in performance of ADLs  - Assess for home care needs following discharge   - Consider OT consult to assist with ADL evaluation and planning for discharge  - Provide patient education as appropriate  Outcome: Progressing  Goal: Maintain proper alignment of affected body part  Description: INTERVENTIONS:  - Support, maintain and protect limb and body alignment  - Provide patient/ family with appropriate education  Outcome: Progressing

## 2021-03-03 NOTE — DISCHARGE INSTRUCTIONS
Mio Loja MD  Department of [address] [place] [address]  Via Moriah Plummer [address] [place] [address], [address] [place] [address]  [phone]                                        PATIENT INSTRUCTIONS AFTER TOTAL HIP REPLACEMENT/REVISION    Diet: You may resume your normal diet  It is important to maintain a healthy, balanced diet  Include plenty of fluids, as pain medicines tend to cause constipation  Medications   Pain Medications: A prescription for pain medication has been provided to upon your hospital discharge  Your goal is reduce your pain medication gradually over the next 4-6 weeks  Take your pain medicine with food to avoid stomach discomfort  Please allow at least 24-48 hours (Monday through Friday) from the time of your request to the time a will need the prescription   Constipation: To avoid constipation, take an over-the-counter stool softener (i e  docusate sodium) twice daily such as docusate sodium or Senna S twice daily and Miralax laxative once daily while on pain medication  If the combination does not alleviate your symptoms after 3 days, use a rectal suppository such as dulcolax   Blood Clot Prevention as below:   Aspirin 81mg TWICE daily x 6 weeks                   Activity   Rest Periods: Gradually increase your activity on a daily basis  The amount of time you spend out of bed and the number and distance of your walks should gradually increase each day  Between activities such as walking, meals, exercises, etc , take a rest period for approximately 1 hour in the morning, afternoon and evening  Limit sitting to 1 hour intervals for the next 4 - 6 weeks   Weight-bearing: You may put as much weight as is comfortable on your operative leg with activity  Use a walker or crutches while walking for at least 3 weeks  At that time, it is advised to use a cane until you are able to apply full weight to the operated leg     Impact Loading: Low-impact activities such as golf, stationary bicycling and slow dancing may begin in 6 weeks, while swimming is allowed at 3 weeks after surgery  All activities involving quick starts and stops, or impact loading, such as tennis, should be avoided to lower your risk of early loosening of the prosthesis   Bathing:  Because it is difficult to get in and out of a bathtub, we recommend using a shower for bathing  A shower stall with a low entry step is recommended  You may use a high stool in the shower if there is space  You do not have to cover your incision if dermabond is used  Simply shower and cleanse the incision  No creams or ointments on the incision for 4 weeks   Driving: You should not drive until approximately 4 weeks after surgery  While you are traveling as a passenger for the first 4 weeks following surgery, it is advised that you get out of the car at least hourly and take a short walk   Returning to work: The decision to return to work will be based on the type of work you do, your physical stamina, and whether you have other medical conditions  We recommend that you avoid making any major changes in your work or [employer] plans until your recovery is complete   Dislocation Precautions No crossing your legs for 3 months  Wound Care   Your incision is covered with a Prineo dressing  This consists of a reinforcing mesh and Dermabond glue  Do not remove this dressing, it will remain in place for 3 weeks  If portions of the dressing start to peel, you may carefully trim the loose dressing with scissors   You do NOT have to cover your incision  Simply shower and allow water to run over the incision   Do NOT scrub or rub the incision  No creams or ointments on the incision for 4 weeks   Your incision may be warm, itchy, and slightly red for several weeks after surgery  Excessive redness, soreness, or drainage from the incision area should be reported to our office       Common Problems   Leg & ankle swelling: You may have some swelling in your operated leg that should gradually decrease  If swelling occurs, lie down, elevate your legs, and rest    Pain:  Pain may be a result of over-activity  When you are in pain, sit or lie down, elevated your legs, and rest   If the pain does not subside, take the pain medication prescribed for you  Pain is a protective mechanism that helps to prevent over-usage and should not be ignored  Return Appointments: You should have a scheduled appointment for followup  If you do not already have a followup appointment scheduled, please call the office at [phone] to schedule an appointment  Call our office if you have:  Temperature of 101o or higher  Drainage from your incision  Increasing redness around your incision  Increasing  pain around the incision, unrelieved by pain medication  Excessive calf or thigh pain & swelling that does not go away with elevation and rest      ANTIBIOTIC PROPHYLAXIS AFTER TOTAL JOINT REPLACEMENT  For protection against the remote possibility of blood-borne bacteria, carried from the mouth during a dental procedure, creating an infection in a total joint replacement, a combined task force of [employer] and the [address] [place] [address] has made the following guideline recommendations:     Following total joint replacement, all patients are advised to take an antibiotic regimen for the following dental procedures AS LIFETIME THERAPY:   Prophylactic cleaning of teeth or implants   Intraligamentary local anesthetic injections   Periodontal procedures   Root canal procedures   Dental extractions   Dental implant procedures   Implantation of avulsed teeth   Initial placement of orthodontic bands    The recommended antibiotic regimen (if not allergic to penicillin) is:  Amoxicillin, Cephalexin (e g  Keflex), or Cephradine two (2 0) grams orally one (1) hour prior to the dental procedure  For patients with a penicillin allergy, the recommended antibiotic is:  Clindamycin (e g  Cleocin) 600 mg orally one hour prior to the dental procedure  Antibiotic prophylaxis is not warranted for dental procedures for patients with previously placed orthopedic pins, plates or screws  The above recommendations are considered minimum guidelines  Your doctor and/or dentist are ultimately responsible for making individual treatment recommendations to you based on their clinical judgment

## 2021-03-03 NOTE — PHYSICAL THERAPY NOTE
PT eval   03/03/21 1450   PT Last Visit   PT Visit Date 03/03/21   Note Type   Note type Evaluation   Pain Assessment   Pain Assessment Tool 0-10   Pain Score 5   Pain Location/Orientation Orientation: Left; Location: Hip   Home Living   Type of [address] One level  (7 eddie)   Prior Function   Level of [place] Independent with ADLs and functional mobility   Lives With Spouse   Receives Help From Family   ADL Assistance Independent   IADLs Independent   Falls in the last 6 months 0   Vocational Full time employment   Restrictions/Precautions   Weight Bearing Precautions Per Order Yes   LLE Weight Bearing Per Order WBAT   General   Additional Pertinent History PMH + CP with Lle tone cahnges benjamín plantar flexion at ankle  Some out-pt PT pre-op   Family/Caregiver Present Yes   Cognition   Overall Cognitive Status WFL   Arousal/Participation Alert   Orientation Level Oriented X4   Memory Within functional limits   Following Commands Follows all commands and directions without difficulty   RUE Assessment   RUE Assessment WFL   LUE Assessment   LUE Assessment WFL   RLE Assessment   RLE Assessment WFL   LLE Assessment   LLE Assessment   (Aarom wfl increased tone for plantar flexion, hip flexion)   Coordination   Movements are Fluid and Coordinated 0   Coordination and Movement Description tone changes Lle form CP   Light Touch   RLE Light Touch Impaired   LLE Light Touch Impaired   Proprioception   RLE Proprioception Grossly intact   LLE Proprioception Grossly Intact   Transfers   Sit to Stand 4  Minimal assistance   Additional items Assist x 1   Stand to Sit 4  Minimal assistance   Additional items Assist x 1   Stand pivot 4  Minimal assistance   Additional items Assist x 1  (rw)   Ambulation/Elevation   Gait pattern Narrow JESUS; Forward Flexion; Shuffling; Inconsistent jose;Decreased L stance; Short stride; Step to   Gait Assistance 4  Minimal assist   Additional items Assist x 1;Verbal cues; Tactile cues Assistive Device Rolling walker   Distance 5'   Balance   Static Sitting Good   Dynamic Sitting Good   Static Standing Fair +   Dynamic Standing Fair +   Ambulatory Fair +   Endurance Deficit   Endurance Deficit No   Activity Tolerance   Activity Tolerance Patient tolerated treatment well   Nurse Made Aware Rn [id number]   Assessment   Prognosis Good   Problem List Impaired tone;Decreased coordination   Assessment Pt presents with imparied mobility, tone adn blaance s/p L ant THR  Hx of CP which impairs L le tone and pt with increased le flexion adn foot supination  Decreased HS wiht amb  Hoping to progress and have less tone with muscle relaxer  Desires home d/c if able to do steps next session  Barriers to Discharge   (mobility, medical clearance)   Goals   Patient Goals walk better  go home   STG Expiration Date 03/03/21   Short Term Goal #1 1) safe ind abm with rw 50' adn up/down stesp with 2 rails wbat Lle   Plan   Treatment/Interventions ADL retraining;Functional transfer training;Elevations; Patient/family training;Equipment eval/education;Gait training;Spoke to MD;Spoke to nursing;Spoke to case management;OT   PT Frequency Twice a day   Recommendation   PT Discharge Recommendation   (out-pt PT)   Equipment Recommended [facility] Recommended Wheeled walker   PT - OK to Discharge No   Additional Comments needs to perform steps    AM-PAC Basic Mobility Inpatient   Turning in Bed Without Bedrails 3   Lying on Back to Sitting on Edge of Flat Bed 3   Moving Bed to Chair 3   Standing Up From Chair 3   Walk in Room 3   Climb 3-5 Stairs 2   Basic Mobility Inpatient Raw Score 17   Basic Mobility Standardized Score 39 67   Modified Ramsey Scale   Modified Ramsey Scale 3   Luba West, PT

## 2021-03-03 NOTE — PHYSICAL THERAPY NOTE
PT tx     03/03/21 1650   PT Last Visit   PT Visit Date 03/03/21   Note Type   Note Type Treatment   Pain Assessment   Pain Assessment Tool 0-10   Pain Score 4   Pain Location/Orientation Orientation: Left; Location: Hip  (spasm)   Restrictions/Precautions   Weight Bearing Precautions Per Order Yes   LLE Weight Bearing Per Order WBAT   General   Chart Reviewed Yes   Additional Pertinent History rec'd robaxin just prior to session   Cognition   Overall Cognitive Status WFL   Orientation Level Oriented X4   Subjective   Subjective wish the spasm would calm down   Transfers   Sit to Stand 5  Supervision   Stand to Sit 5  Supervision   Stand pivot 5  Supervision   Additional items   (rw)   Ambulation/Elevation   Gait pattern Inconsistent jose;Decreased L stance; Short stride  (L foot lacks HS at times foot supinated with Hal Row)   Gait Assistance 5  Supervision   Assistive Device Rolling walker   Distance 50'x2   Stair Management Assistance 5  Supervision   Additional items Verbal cues; Increased time required   Stair Management Technique Two rails; Foreward;Nonreciprocal   Number of Stairs   (2 up 3 down)   Balance   Static Sitting Normal   Dynamic Sitting Good   Static Standing Fair +   Dynamic Standing Fair +   Ambulatory Fair +   Endurance Deficit   Endurance Deficit No   Activity Tolerance   Activity Tolerance Patient tolerated treatment well   Nurse Made Aware RN Del   Exercises   Balance training  standing hip stretch to increase extension and decrease spasm x 5   Assessment   Prognosis Good   Problem List Impaired tone   Assessment Pt able to amb short distance and manage steps with 2 rails with correct sequence  Approrpatie to d/c home when medically cleared  RW dispensed by Care manager  d/c PT   Barriers to Discharge   (meidcal clearance)   Goals   Patient Goals walk better  go home   PT Treatment Day   (d/c PT)   Plan   Treatment/Interventions Elevations;Gait training   Progress Improving as expected   PT Frequency   (d/c PT)   Recommendation   PT Discharge Recommendation   (out-pt PT)   Equipment Recommended [facility] Recommended Wheeled walker   AM-PAC Basic Mobility Inpatient   Turning in Bed Without Bedrails 3   Lying on Back to Sitting on Edge of Flat Bed 3   Moving Bed to Chair 3   Standing Up From Chair 4   Walk in Room 4   Climb 3-5 Stairs 4   Basic Mobility Inpatient Raw Score 21   Basic Mobility Standardized Score 45 55   Kai Govea, PT

## 2021-03-03 NOTE — INTERVAL H&P NOTE
H&P reviewed  After examining the patient I find no changes in the patients condition since the H&P had been written  I saw the patient in the holding area and confirmed the correct site and procedure, HAYLEY GOMES  I placed my initials on the site  LLE:  Sensation Intact to Light Touch in Sural, Saphenous, Tibial, Superficial Peroneal, and Deep Peroneal Nerve Distribution  Motor function 5 out of 5 strength in  Gastrocnemius, Soleus  He is unable to fire TA, Extensor Hallucis Longus, and Flexor Hallucis Longus Muscles  Extremity Warm and Well Perfused  Brisk Capillary Refill in Toes         Vitals:    03/03/21 0857   BP: (!) 165/108   Pulse: 102   Resp: 18   Temp: 98 2 °F (36 8 °C)   SpO2: 96%

## 2021-03-03 NOTE — NURSING NOTE
Reviewed DC instructions with wife and Donte Fulton  Both comfortable with instructions  Escorted to car via wheelchair with Erik Donis [facility], [facility]

## 2021-03-03 NOTE — ANESTHESIA POSTPROCEDURE EVALUATION
Post-Op Assessment Note    CV Status:  Stable  Pain Score: 0    Pain management: adequate     Mental Status:  Alert and awake   Hydration Status:  Euvolemic   PONV Controlled:  Controlled   Airway Patency:  Patent      Post Op Vitals Reviewed: Yes      Staff: CRNA         No complications documented      BP     Temp      Pulse     Resp      SpO2

## 2021-03-03 NOTE — UTILIZATION REVIEW
Initial Clinical Review    Elective inpatient  surgical procedure    Age/Sex: 46 y o  male     Surgery Date: 3/3/2021     Procedure: LEFT total hip arthroplasty  Anterior approach    Anesthesia: spinal     Operative Findings: osteoarthritis of the LEFT hip    Admission Orders: Date/Time/Statement:   Admission Orders (From admission, onward)     Ordered        03/03/21 1218  INPATIENT ADMISSION  Once                   Orders Placed This Encounter   Procedures    INPATIENT ADMISSION     Standing Status:   Standing     Number of Occurrences:   1     Order Specific Question:   Level of Care     Answer:   Med Surg [16]     Order Specific Question:   Estimated length of stay     Answer:   More than 2 Midnights     Order Specific Question:   Certification     Answer:   I certify that inpatient services are medically necessary for this patient for a duration of greater than two midnights  See H&P and MD Progress Notes for additional information about the patient's course of treatment  Vital Signs: /90 (BP Location: Right arm)   Pulse 86   Temp 97 9 °F (36 6 °C) (Oral)   Resp 19   Ht 5' 4" (1 626 m)   Wt 73 2 kg (161 lb 4 8 oz)   SpO2 96%   BMI 27 69 kg/m²       Results from last 7 days   Lab Units 03/03/21  1246   HEMOGLOBIN g/dL 16 1   HEMATOCRIT % 48 0       Diet: Regular    Mobility: activity as tolerated  DVT Prophylaxis: asa  Bilateral SCDs       Medications/Pain Control:   Scheduled Medications:  aspirin, 81 mg, Oral, BID  buPROPion, 150 mg, Oral, BID  cefazolin, 2,000 mg, Intravenous, Q8H  celecoxib, 100 mg, Oral, BID  pregabalin, 75 mg, Oral, BID  senna, 1 tablet, Oral, Daily      Continuous IV Infusions:  lactated ringers, 1 5 mL/kg/hr, Intravenous, Continuous      PRN Meds:  acetaminophen, 650 mg, Oral, Q6H PRN  calcium carbonate, 1,000 mg, Oral, Daily PRN  HYDROmorphone, 0 5 mg, Intravenous, Q2H PRN  lactated ringers, 1,000 mL, Intravenous, Once PRN    And  lactated ringers, 1,000 mL, Intravenous, Once PRN  LORazepam, 1 mg, Oral, Q8H PRN  methocarbamol, 500 mg, Oral, Q6H PRN - used x  1   ondansetron, 4 mg, Intravenous, Q6H PRN  oxyCODONE, 10 mg, Oral, Q4H PRN - used x 1   oxyCODONE, 5 mg, Oral, Q4H PRN  sodium chloride, 1,000 mL, Intravenous, Once PRN    And  sodium chloride, 1,000 mL, Intravenous, Once PRN    Discharged 3/3/2021 3529    Network Utilization Review Department  ATTENTION: Please call with any questions or concerns to [phone] and carefully listen to the prompts so that you are directed to the right person  All voicemails are confidential   Chelo Soto all requests for admission clinical reviews, approved or denied determinations and any other requests to dedicated fax number below belonging to the campus where the patient is receiving treatment   List of dedicated fax numbers for the Facilities:  [address] DENIALS (Administrative/Medical Necessity) [phone]   [address] (Maternity/NICU/Pediatrics) [phone]   [address] [place] [address] Dr Millie Ware [address] (Erik Urbano "Brie" 103) [address] [place] [address] [address] Damian Argueta [address] [place]) [facility] [place] [address] [phone]

## 2021-03-03 NOTE — CASE MANAGEMENT
LOS: 0  Patient is not a 30 day readmission or a Medicare Bundled patient  His risk of unplanned readmission is 5, Travis  Met with patient with his wife, Michelle Berger present to review the discharge planning process including identifying help at home and patient preference for discharge  Patient and his wife share a one level home with 7 KIP  PTA, patient was independent of ADL's and used no assistive device  [facility] for his meds and reports no barriers in obtaining his meds  His PCP is Lm Nichole  He denies HHC/SNF/MH/D&A rehab admission  He does not have a POA/LW and is not interested in receiving information  His wife is his help at home  Patient hopes to be discharged home tonight and will go to outpatient PT/OT  Received orders for a RW, patient is agreeable  Freedom of Choice given and he has no preference  Obtained a RW from [facility]Tad[facility] closet at [facility] and given to patient  He is not interested in a BSC  His wife will secure a shower bench  She will transport him hoe at discharge

## 2021-03-03 NOTE — ANESTHESIA PREPROCEDURE EVALUATION
Procedure:  ARTHROPLASTY HIP TOTAL ANTERIOR (Left Hip)    Relevant Problems   CARDIO   (+) Essential hypertension   (+) Mixed hyperlipidemia      MUSCULOSKELETAL   (+) Arthritis of left hip      NEURO/PSYCH   (+) Anxiety   (+) Chronic pain syndrome        Physical Exam    Airway    Mallampati score: I  TM Distance: >3 FB  Neck ROM: full     Dental       Cardiovascular  Cardiovascular exam normal    Pulmonary  Pulmonary exam normal     Other Findings        Anesthesia Plan  ASA Score- 2     Anesthesia Type- spinal with ASA Monitors  Additional Monitors:   Airway Plan:           Plan Factors-    Chart reviewed  EKG reviewed  Existing labs reviewed  Patient summary reviewed  Patient is a current smoker  Patient instructed to abstain from smoking on day of procedure  Patient did not smoke on day of surgery  Induction-     Postoperative Plan- Plan for postoperative opioid use  Informed Consent- Anesthetic plan and risks discussed with patient  I personally reviewed this patient with the CRNA  Discussed and agreed on the Anesthesia Plan with the CRNA  Hilario Palafox

## 2021-03-03 NOTE — OCCUPATIONAL THERAPY NOTE
Occupational Therapy Evaluation and Treatment     Patient Name: Nicky Gay  SJWPO'[id number] Date: 3/3/2021  Problem List  Principal Problem:    Arthritis of left hip    Past Medical History  Past Medical History:   Diagnosis Date    Anxiety     Arthritis     Back pain     Chronic pain disorder     Claustrophobia     Depression     Hypertension     Wears glasses      Past Surgical History  Past Surgical History:   Procedure Laterality Date    FL INJECTION LEFT HIP (ARTHROGRAM)  1/12/2021    FOOT CAPSULE RELEASE W/ PERCUTANEOUS HEEL CORD LENGTHENING, TIBIAL TENDON TRANSFER Left     HAND SURGERY Right     tedon repair    HERNIA REPAIR Right              03/03/21 1555   OT Last Visit   OT Visit Date 03/03/21   Note Type   Note type Evaluation   Restrictions/Precautions   Weight Bearing Precautions Per Order Yes   LLE Weight Bearing Per Order WBAT   Pain Assessment   Pain Assessment Tool 0-10   Pain Score 3   Pain Location/Orientation Orientation: Left; Location: Hip   Home Living   Type of [address] [place] [address] Two level;Stairs to enter with rails; Able to live on main level with bedroom/bathroom  ([facility], [address], pt stays on first floor)   Bathroom Shower/Tub Tub/shower unit   Bathroom Toilet Standard   Additional Comments Pt plans to leave with RW (per PT) and might follow up to purchase a shower chair  Prior Function   Level of [place] Independent with ADLs and functional mobility   Lives With Spouse;Daughter  (09CU)   ADL Assistance Independent   IADLs Independent   Vocational Full time employment   Comments 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  No AD PTA   Wife will be taking a few days off to assist     Psychosocial   Psychosocial (WDL) WDL   Subjective   Subjective "Does everyone have this much pain?" Pt pleasant, c/o pain and spasms in LLE    ADL   Where Assessed Chair   Eating Assistance 7  Independent   Grooming Assistance 7  [facility] 7  Independent   LB Bathing Assistance 3  Moderate Assistance   UB Dressing Assistance 7  Independent   LB Dressing Assistance 3  Moderate Assistance   Toileting Assistance  5  Supervision/Setup   Additional Comments Pt unable to don sock on L foot, wife states she will complete at home  Pt dons pants operative leg first with supervision  Demonstrates toilet transfer onto regular height toilet seat with supervision  Bed Mobility   Additional Comments Recieved OOB to chair  Transfers   Sit to Stand 4  Minimal assistance   Additional items Assist x 1   Stand to Sit 4  Minimal assistance   Additional items Assist x 1   Stand pivot 5  Supervision   Toilet transfer 5  Supervision   Additional items Assist x 1   Additional Comments Cues to advance L leg after walker  Balance   Static Sitting Normal   Dynamic Sitting Good   Static Standing Fair +   Dynamic Standing Fair   Activity Tolerance   Activity Tolerance Patient limited by pain  (Limited by spasms in leg)   Medical Staff Made Leopold Gilding, PT   Nurse Made Aware ok to see per RN   RUE Assessment   RUE Assessment WFL   LUE Assessment   LUE Assessment WFL   Hand Function   Gross Motor Coordination Functional   Fine Motor Coordination Functional   Vision-Basic Assessment   Current Vision No visual deficits   Cognition   Overall Cognitive Status WFL   Arousal/Participation Alert; Cooperative   Attention Within functional limits   Orientation Level Oriented X4   Memory Within functional limits   Following Commands Follows all commands and directions without difficulty   Assessment   Limitation Decreased ADL status; Decreased Safe judgement during ADL;Decreased endurance;Decreased self-care trans;Decreased high-level ADLs   Prognosis Good   Assessment Pt is a 46 y o  male seen for OT evaluation s/p admit to [facility] on 3/3/2021 w/ Arthritis of left hip    Comorbidities affecting pt's functional performance at time of assessment include: anxiety, ETOH use disorder, mixed hyperlipidemia, left hip pain, essential HTN, chronic pain syndrome  Pt recieved post op day 0 after anterior MIREYA, WBAT, no precautions    Personal factors affecting pt at time of IE include:steps to enter environment, difficulty performing ADLS and difficulty performing IADLS   Prior to admission, pt was independent in ADLs, IADLs and functional mobility with no AD  Upon evaluation: Pt requires min A for functional mobility and mod A for donning socks 2* the following deficits impacting occupational performance: decreased ROM, decreased strength, decreased balance, decreased tolerance, increased pain and orthopedic restrictions  Pt to benefit from continued skilled OT tx while in the hospital to address deficits as defined above and maximize level of functional independence w ADL's and functional mobility  Occupational Performance areas to address include: bathing/shower, dressing, functional mobility and community mobility  From OT standpoint, recommendation at time of d/c would be home with family support and outpatient therapy  Goals   Patient Goals Pain relief  Plan   Treatment Interventions ADL retraining;Functional transfer training; Endurance training;Patient/family training;Equipment evaluation/education; Compensatory technique education   Goal Expiration Date 03/13/21   OT Treatment Day 1   OT Frequency Other (comment)  (Would see for additional session if DC canceled  )   Additional Treatment Session   Start Time 1540   End Time 1555   Treatment Assessment Pt educated in car transfer, tub transfer to shower chair, dressing, safe toilet transfer, and home modifications  Demonstrates understanding      Recommendation   OT Discharge Recommendation Home with skilled therapy   AM-PAC Daily Activity Inpatient   Lower Body Dressing 3   Bathing 3   Toileting 4   Upper Body Dressing 4   Grooming 4   Eating 4   Daily Activity Raw Score 22   Daily Activity Standardized Score (Calc for Raw Score >=11) 47 1   Barthel Index   Feeding 10 Bathing 0   Grooming Score 5   Dressing Score 5   Bladder Score 10   Bowels Score 10   Toilet Use Score 10   Transfers (Bed/Chair) Score 15   Mobility (Level Surface) Score 10   Stairs Score 5   Barthel Index Score 80       GOALS    1) Pt will improve activity tolerance to G for min 30 min txment sessions    2) Pt will complete UB/LB dressing/self care w/ mod I using adaptive device and DME as needed    3) Pt will complete bathing w/ Mod I w/ use of AE and DME as needed    4) Pt will complete toileting w/ mod I w/ G hygiene/thoroughness using DME as needed    5) Pt will improve functional transfers to Mod I on/off all surfaces using DME as needed w/ G balance/safety     6) Pt will improve functional mobility during ADL/IADL/leisure tasks to Mod I using DME as needed w/ G balance/safety     7) Pt will participate in simulated IADL management task to increase independence to Mod I w/ G safety and endurance    8) Pt will demonstrate G attention for 10 minutes during ongoing cognitive assessment to assist w/ safe d/c planning/recommendations    9) Pt will demonstrate G carryover of pt/caregiver education and training as appropriate w/ mod I w/o cues w/ good tolerance    10) Pt will demonstrate 100% carryover of energy conservation techniques w/ mod I t/o functional I/ADL/leisure tasks w/o cues s/p skilled education    Ravi Rivas, JAYCE, OTR/L

## 2021-03-03 NOTE — OP NOTE
OPERATIVE NOTE    PATIENT: Shantell Fernando  MRN: [id number]  DATE OF OPERATION: 03/03/21    SURGEON: Toya Jones MD     ASSISTANT: Jesus Manuel Moura MD; Delvin Castorena PA-C    An assistant was necessary for the surgery given the complexity of the operation, and the need for a skilled surgical assistant for proper retraction of critical soft tissues including ligaments, tendons, and neurovascular structures, as well as adjacent bony structures  Precise retractor placement and maintenance of precise retraction is critical, as is skilled positioning of the limb during the different parts of the procedure  PREOPERATIVE DIAGNOSIS: Osteoarthritis of the LEFT hip  POSTOPERATIVE DIAGNOSIS: Osteoarthritis of the LEFT hip  PROCEDURE PERFORMED: LEFT total hip arthroplasty  APPROACH: Anterior  STAFF:   Circulator: Dillan Ellington RN  Radiology Technologist: Heather Edward  Relief Scrub: Erwin Marie RN  Scrub Person: Alonso Garcia RN    ANESTHESIA: Spinal    ESTIMATED BLOOD LOSS: 200 mL    IMPLANTS:  Implant Name Type Inv  Item Serial No   Lot No  LRB No  Used Action   COMPONENT ACETABULAR PINNACLE PRESS FIT 52MM GRIPTION - [id number]  COMPONENT ACETABULAR PINNACLE PRESS FIT 52MM GRIPTION  DEPUY [id number] Left 1 Implanted   LINER ACTB ULT LNK PE 36 X 52MM +4 NEUTRAL ALTRX - [id number]  LINER ACTB ULT LNK PE 36 X 52MM +4 NEUTRAL ALTRX  DEPUY [id number] Left 1 Implanted   SCREW DAILY 6 5 X 30MM SLF TAP PINNACLE - [id number]  SCREW DAILY 6 5 X 30MM SLF TAP PINNACLE  DEPUY [id number] Left 1 Implanted   HEAD FEMORAL CMNTLS 12/14 TPR 36MM +5MM BIOLOX DELTA ARTICULEZE - [id number]  HEAD FEMORAL CMNTLS 12/14 TPR 36MM +5MM BIOLOX DELTA ARTICULEZE  DEPUY [id number] Left 1 Implanted   STEM FEM SZ 6 TAPER CMNTLS HI COLLAR ACTIS - [id number]  STEM FEM SZ 6 TAPER CMNTLS HI COLLAR ACTIS  DEPUY [id number] Left 1 Implanted       COMPLICATIONS: Nil      INDICATIONS:  Patient presented with osteoarthritis of the LEFT hip   Pain is severe such that it inhibits quality of life and activities of daily living  Pain no longer adequately responds to nonoperative treatment  Therefore, the risks, benefits, alternatives, and complications of MIREYA were discussed with the patient  Patient expressed understanding, elected to proceed, and provided written consent  DESCRIPTION OF PROCEDURE:  The patient was brought to the operating room, given a general anesthetic, placed supine on the fracture table  Both legs were situated in boots and the pelvis was stabilized  The LEFT hip was sterilely prepared and draped in standard fashion  Preoperative antibiotics were administered  A standard anterior approach to the hip was performed with an anterior approach over the tensor fascia dami  Skin incision was made, subcutaneous tissue was divided, fascia over the tensor was divided and the tensor was bluntly dissected and retracted posteriorly  A deep retractor was placed  The circumflex vessels were identified and cauterized  The rectus femoris muscle was identified, elevated off the anterior capsule, and retractors were placed anteromedially over the anterior femoral neck and posteriorly in the superior lateral femoral neck in the interval between the trochanter and the femoral head  A capsulotomy was performed in standard H fashion and then because of the redundant capsule, it was excised both anteriorly and posteriorly  Large anterior osteophytes of the acetabulum were identified and resected  Femoral neck osteotomy was performed about 1-1 5 cm above the lesser trochanter and the femoral head was removed  The acetabulum was exposed by capsulectomy and retraction anteriorly and posteriorly around the acetabular rim  Soft tissue dissection around the rim was performed and then reaming was begun and was performed sequentially up to a size 52mm reamer   A trial fit was obtained and then we selected a 52mm cup, we placed this in 40 degrees of abduction and about 15 degrees of anteversion  We seated this, checked position on fluoroscopy, confirmed this, excellent fit was obtained and we placed the liner  We then exposed the femoral neck by external rotation and extension  We placed a medial retractor  We performed a completion of the capsulectomy around the greater trochanter  We released the short external rotators and flipped the remaining ones over the trochanteric tubercle and then proceeded to elevate the femur up and out of the wound to gain access  We then used an initiating osteotome, canal finder, initiating broach and then started broaching up to a size 6  We trialed with a size 6 highoffset stem, 36+5mm ball  Stem position was excellent  We were happy with this and then elected to place the final stem  We placed a 36+5mm head, reduced the hip, checked stability  Combined anteversion was excellent  The final x-ray was obtained with all components in good position  The wound was irrigated  The fascia of the tensor was closed with interrupted #1 and running and #2 barbed suture  The subcutaneous tissue was closed with 2-0 Vicryl  The skin was closed with 4-0 Monocryl and a Prineo dressing was placed  A dry sterile dressing was applied  The patient was awoken and taken to the recovery room in stable condition  Instrument, needle, and sponge report was correct  DISPOSITION:  Weight bearing as tolerated  No precautions required  DVT prophylaxis: ASA 81mg bid x6 weeks      Alvin Kilpatrick MD  Adult Reconstruction  Department of Orthopaedic Surgery  [address]  12:27 PM

## 2021-03-04 ENCOUNTER — TRANSITIONAL CARE MANAGEMENT (OUTPATIENT)
Dept: FAMILY MEDICINE CLINIC | Facility: CLINIC | Age: 51
End: 2021-03-04

## 2021-03-05 NOTE — UTILIZATION REVIEW
Notification of Discharge  This is a Notification of Discharge from our facility [address]  Please be advised that this patient has been discharge from our facility  Below you will find the admission and discharge date and time including the patients disposition  PRESENTATION DATE: 3/3/2021  8:28 AM  OBS ADMISSION DATE:   IP ADMISSION DATE: 3/3/21 1219   DISCHARGE DATE: 3/3/2021  5:54 PM  DISPOSITION: Home/Self Care Home/Self Care   Admission Orders listed below:  Admission Orders (From admission, onward)     Ordered        03/03/21 1218  INPATIENT ADMISSION  Once                   Please contact the UR Department if additional information is required to close this patient's authorization/case  Nikia Milian  [facility] Utilization Review Department  Main: [phone] x carefully listen to the prompts  All voicemails are confidential   [email or web address]  Send all requests for admission clinical reviews, approved or denied determinations and any other requests to dedicated fax number below belonging to the campus where the patient is receiving treatment   List of dedicated fax numbers:  [address] DENIALS (Administrative/Medical Necessity) [phone]   [address] (Maternity/NICU/Pediatrics) [phone]   Bronwyn Allen [phone]   Jethro Hood [phone]   [facility] [phone]   [facility] [place] [address] [facility] [phone]   [facility]  [phone]   [address] [facility] [address]  [address] [facility] [address] [facility] [phone]

## 2021-03-09 ENCOUNTER — TELEPHONE (OUTPATIENT)
Dept: OBGYN CLINIC | Facility: HOSPITAL | Age: 51
End: 2021-03-09

## 2021-03-09 DIAGNOSIS — M16.12 PRIMARY OSTEOARTHRITIS OF LEFT HIP: ICD-10-CM

## 2021-03-09 DIAGNOSIS — Z96.649 HISTORY OF TOTAL HIP REPLACEMENT, UNSPECIFIED LATERALITY: Primary | ICD-10-CM

## 2021-03-09 RX ORDER — NALOXONE HYDROCHLORIDE 4 MG/.1ML
SPRAY NASAL
Qty: 1 EACH | Refills: 1 | Status: SHIPPED | OUTPATIENT
Start: 2021-03-09 | End: 2021-03-12

## 2021-03-09 RX ORDER — TRAMADOL HYDROCHLORIDE 50 MG/1
50 TABLET ORAL EVERY 6 HOURS PRN
Qty: 30 TABLET | Refills: 0 | Status: SHIPPED | OUTPATIENT
Start: 2021-03-09 | End: 2021-03-12

## 2021-03-09 NOTE — TELEPHONE ENCOUNTER
Patient sees Dr Mishel Licona  Patient called because last Wednesday he had a hip replacement  He has been taking Oxycodone 5 mg but is not helping with pain  He wants to know, if there is any other pain medication that can be prescribe or any other advise on what to do?    # [phone]

## 2021-03-09 NOTE — TELEPHONE ENCOUNTER
Patient had called earlier to state he needed medication for pain and picked up the prescriptions  He's ok with the Tramadol, but he's very offended that Narcan was prescribed  naloxone (NARCAN) 4 mg/0 1 mL nasal spray      He's not a heroin addict  He says it's offensive to him that it was prescribed        Jeniffer Mendieta TP#[phone]

## 2021-03-09 NOTE — TELEPHONE ENCOUNTER
I spoke to patient and he is taking the celebrex and tylenol without improvement  Can he come of celebrex and take aleve or motrin?

## 2021-03-09 NOTE — TELEPHONE ENCOUNTER
He should be taking tylenol and celebrex as well  We can try tramadol for help with his pain as well  I will place a tramadol order      MLB

## 2021-03-10 NOTE — TELEPHONE ENCOUNTER
It is not meant to be offensive but it is state and hospital guidelines that narcan be prescribed with narcotics  Jamel Oppenheim Erroll Mages, MD  Adult Reconstruction  Department of [facility] [address]  03/10/21  8:42 AM

## 2021-03-11 ENCOUNTER — TELEPHONE (OUTPATIENT)
Dept: OBGYN CLINIC | Facility: HOSPITAL | Age: 51
End: 2021-03-11

## 2021-03-11 ENCOUNTER — OFFICE VISIT (OUTPATIENT)
Dept: PHYSICAL THERAPY | Facility: CLINIC | Age: 51
End: 2021-03-11
Payer: COMMERCIAL

## 2021-03-11 DIAGNOSIS — M16.12 PRIMARY OSTEOARTHRITIS OF LEFT HIP: ICD-10-CM

## 2021-03-11 DIAGNOSIS — Z96.642 STATUS POST TOTAL REPLACEMENT OF LEFT HIP: Primary | ICD-10-CM

## 2021-03-11 DIAGNOSIS — Z96.649 HISTORY OF TOTAL HIP REPLACEMENT, UNSPECIFIED LATERALITY: Primary | ICD-10-CM

## 2021-03-11 PROCEDURE — 97164 PT RE-EVAL EST PLAN CARE: CPT | Performed by: PHYSICAL THERAPIST

## 2021-03-11 NOTE — PROGRESS NOTES
PT Evaluation     Today's date: 3/11/2021  Patient name: Trisha Rincno  : 1970  MRN: [id number]  Referring provider: Rudolpho Goltz, MD  Dx:   Encounter Diagnosis     ICD-10-CM    1  Status post total replacement of left hip  Z96 642    2  Primary osteoarthritis of left hip  M16 12                   Assessment  Assessment details: Pt is a 46y o  year old male coming to outpatient PT s/p L MIREYA on 3/3/21  Pt presents with increased pain and TTP,  decreased L hip AROM/ PROM, decreased L hip strength,  and overall decreased functional mobility  Pt would benefit from skilled PT services in order to address these deficits and reach maximum level of function  Thank you kindly for the referral!  Impairments: abnormal gait, abnormal muscle tone, abnormal or restricted ROM, activity intolerance, impaired balance, impaired physical strength, lacks appropriate home exercise program, pain with function and weight-bearing intolerance  Barriers to therapy: 1  History of cerebral palsy  Understanding of Dx/Px/POC: good   Prognosis: good    Goals  STG's ( 3-4 weeks)  1  Pt will be independent in HEP  2  Pt will have decreased hip pain rated 2-3/10 with activity  LTG's ( 6- 8 weeks)  1  Improve FOTO score by 8-10 points  2  Pt will be able to walk community distances without an assistive device  3  Pt will be able to go up and down the steps reciprocally  4   Improve L hip strength by 1/2 grade    Plan  Patient would benefit from: PT eval and skilled physical therapy  Planned modality interventions: cryotherapy  Planned therapy interventions: manual therapy, neuromuscular re-education, balance, therapeutic activities, stretching, strengthening, therapeutic exercise, home exercise program, functional ROM exercises and flexibility  Frequency: 2x week  Duration in weeks: 6  Plan of Care beginning date: 3/11/2021  Plan of Care expiration date: 2021  Treatment plan discussed with: patient        Subjective Evaluation    History of Present Illness  Date of surgery: 3/3/2021  Mechanism of injury: surgery  Mechanism of injury: Pt underwent L MIREYA anterior approach on 3/3/21  Pt also had a leg lengthening procedure 2* cerebral palsy diagnosis  Pt is able to walk inside his home with B axillary crutches  Pt is going the steps one at a time  Pt is able to stand for about 20-30 minutes with with crutches  Pt's spouse is helping him put on his shoes and socks  Work: not currently working; needs to be able to climb [employer]: traveling, beach, hiking, biking  Gait: pt ambulates with B axillary crutches with decreased weight bearing L LE    Pain  At best pain ratin  At worst pain ratin  Location: L hip  Quality: dull ache  Relieving factors: ice and medications    Social Support  Steps to enter house: yes  8  Stairs in house: no   L[facility] in: [facility]  Lives with: spouse    Employment status: not working  Patient Goals  Patient goals for therapy: return to work and return to sport/leisure activities  Patient goal: bike riding, hiking, walking, working        Objective     Neurological Testing     Sensation     Hip   Left Hip   Intact: light touch    Right Hip   Intact: light touch    Reflexes   Left   Patellar (L4): normal (2+)    Right   Patellar (L4): normal (2+)  Achilles (S1): normal (2+)    Comments   Left Achilles (S1): abrnormal; L ankle inverts and plantar flexes    Active Range of Motion   Left Hip   Flexion: 85 degrees with pain  Extension: -15 degrees   Abduction: 15 degrees with pain  External rotation (90/90): 15 degrees with pain  Internal rotation (90/90): 15 degrees with pain    Right Hip   Normal active range of motion    Additional Active Range of Motion Details  (+) L anterior and lateral hip TTP with mild warmth to touch  (+) L LE muscular atrophy upon visual inspection 2* CP  Pt has increased pulling sx with supine positioning and is unable to lay with hip in extended position    Strength/Myotome Testing     Left Hip   Planes of Motion   Flexion: 3  Left hip extension strength: NT   Abduction: 3-  Internal rotation: 3+    Isolated Muscles   Gluteus violet: 3+    Right Hip   Normal muscle strength    Left Knee   Flexion: 4+  Extension: 4+    Additional Strength Details  L ankle df MMT: 1/5 with minimal mvt            Dx: s/p L MIREYA ( 3/3/21)  Anterior approach  EPOC: 4/22/21  CO-MORBIDITIES:  PERSONAL FACTORS: not working  Precautions: cerebral palsy      Manuals 3/11            L hip manual stretching                                                    Neuro Re-Ed             Mini squats             sidestepping             Step ups FW 4"            Step ups lat 4"            DLS: alt march 10                                      Ther Ex             Seated ball squeeze 10 x5"            LAQ 10 x5"            Supine SLR AA            bridges             clamshells 10            Quad sets 10x10"            Supine lying             Heel slides             HS stretch                                       Gait Training                                       Modalities             Cp post tx

## 2021-03-12 ENCOUNTER — OFFICE VISIT (OUTPATIENT)
Dept: OBGYN CLINIC | Facility: CLINIC | Age: 51
End: 2021-03-12

## 2021-03-12 VITALS
DIASTOLIC BLOOD PRESSURE: 70 MMHG | SYSTOLIC BLOOD PRESSURE: 131 MMHG | TEMPERATURE: 97.4 F | HEIGHT: 64 IN | WEIGHT: 161 LBS | BODY MASS INDEX: 27.49 KG/M2

## 2021-03-12 DIAGNOSIS — Z96.642 AFTERCARE FOLLOWING LEFT HIP JOINT REPLACEMENT SURGERY: Primary | ICD-10-CM

## 2021-03-12 DIAGNOSIS — Z47.1 AFTERCARE FOLLOWING LEFT HIP JOINT REPLACEMENT SURGERY: Primary | ICD-10-CM

## 2021-03-12 PROCEDURE — 99024 POSTOP FOLLOW-UP VISIT: CPT | Performed by: ORTHOPAEDIC SURGERY

## 2021-03-12 RX ORDER — IBUPROFEN 800 MG/1
TABLET ORAL EVERY 6 HOURS PRN
COMMUNITY

## 2021-03-12 NOTE — PROGRESS NOTES
PROGRESS NOTE:  Post-operative Visit Total Hip Replacement  Date of Surgery: 3/3/21    SUBJECTIVE:  Patient returns for follow-up, status post L DAA MIREYA     The pain is moderate  Medication for pain is tylenol and NSAID  Gait: antalgic  Assistive device:  cane  Return to work: No    Marisabel Armenta presents to office with his wife  He is doing very well  He is using a cane  Doing PT  Taking asa for dvt ppx  He feels his legs feel equal   He does feel tight/stiffness  OBJECTIVE:  Hip wounds are appropriate  No erythema, drainage or obvious signs of infection  Neurovascular status is normal    Limp: mild and moderate  Trace hip flexor weakness but grossly intact  Extension: 0  Flexion: 110  Internal rotation: 10  External rotation: 10  Leg lengths: equal      Sensation Intact to Light Touch in Sural, Saphenous, Tibial, Superficial Peroneal, and Deep Peroneal Nerve Distribution  Motor function 5 out of 5 strength in Gastrocnemius, Soleus, Extensor Hallucis Longus, and Flexor Hallucis Longus Muscles  He is not firing TA (he reports this is baseline due to CP  Extremity Warm and Well Perfused  Brisk Capillary Refill in Toes  RADIOGRAPHS:  No new radiographs    ASSESSMENT & PLAN:  - Continue standard postop total hip protocol, outpatient physical therapy, and wean pain medication     - Reassurance is given that current symptom complex is normal     - OK to massage the scar, gently and progressively for sensitivity     - OK to wean off ambulatory aids  - OK to submerge wound for baths/water aerobics if desired  - Follow-up in 4 weeks, with XR and for clinical check  Discussed with patient that should any issues/questions/concerns arise they should phone the office  Job Ceron MD  Adult Reconstruction  Department [address]  10:44 AM

## 2021-03-16 ENCOUNTER — APPOINTMENT (OUTPATIENT)
Dept: PHYSICAL THERAPY | Facility: CLINIC | Age: 51
End: 2021-03-16
Payer: COMMERCIAL

## 2021-03-16 NOTE — TELEPHONE ENCOUNTER
Spoke to patient  He stated he has no questions all questions were answered by Dr Montrell Allred in [facility] appointment  Patient stated that on his way in to the office Friday he sprained his ankle coming down the stairs  Stated Dr Montrell Allred was made aware at the visit  He stated that after the visit he started to notice cramping and pain in the hip and thigh  Stated it is also bruising which he assumes is from the injury when coming down the stairs  Patient stated he is icing and taking robaxin in addition to he OTC medications for pain  Please advise what else we can recommend at this time

## 2021-03-20 NOTE — TELEPHONE ENCOUNTER
Patient is calling in wanting to speak with triage nurse relating medication he is wanting to know what he should do  He is asking for a callback relating this          Call back# [phone]

## 2021-03-22 ENCOUNTER — OFFICE VISIT (OUTPATIENT)
Dept: FAMILY MEDICINE CLINIC | Facility: CLINIC | Age: 51
End: 2021-03-22
Payer: COMMERCIAL

## 2021-03-22 VITALS
OXYGEN SATURATION: 98 % | WEIGHT: 163 LBS | SYSTOLIC BLOOD PRESSURE: 120 MMHG | DIASTOLIC BLOOD PRESSURE: 80 MMHG | BODY MASS INDEX: 28.88 KG/M2 | HEART RATE: 102 BPM | HEIGHT: 63 IN

## 2021-03-22 DIAGNOSIS — M16.12 ARTHRITIS OF LEFT HIP: Primary | ICD-10-CM

## 2021-03-22 DIAGNOSIS — Z00.00 WELLNESS EXAMINATION: ICD-10-CM

## 2021-03-22 PROCEDURE — 99396 PREV VISIT EST AGE 40-64: CPT | Performed by: FAMILY MEDICINE

## 2021-03-22 PROCEDURE — 3008F BODY MASS INDEX DOCD: CPT | Performed by: FAMILY MEDICINE

## 2021-03-22 PROCEDURE — 1111F DSCHRG MED/CURRENT MED MERGE: CPT | Performed by: FAMILY MEDICINE

## 2021-03-22 PROCEDURE — 99213 OFFICE O/P EST LOW 20 MIN: CPT | Performed by: FAMILY MEDICINE

## 2021-03-22 RX ORDER — HYDROCODONE BITARTRATE AND ACETAMINOPHEN 5; 325 MG/1; MG/1
1 TABLET ORAL EVERY 6 HOURS PRN
Qty: 30 TABLET | Refills: 0 | Status: SHIPPED | OUTPATIENT
Start: 2021-03-22 | End: 2021-03-31 | Stop reason: SDUPTHER

## 2021-03-22 NOTE — PROGRESS NOTES
HPI:  Sada Benitez is a 46 y o  male here for his yearly health maintenance exam    Patient Active Problem List   Diagnosis    Anxiety    Alcohol abuse    Mixed hyperlipidemia    Left hip pain    Arthritis of left hip    Essential hypertension    Chronic pain syndrome     Past Medical History:   Diagnosis Date    Anxiety     Arthritis     Back pain     Chronic pain disorder     Claustrophobia     Depression     Hypertension     Wears glasses        1  Advanced Directive: n     2  Durable Power of  for Healthcare: n     3  Social History:           Drug and alcohol History: n                  4  Immunizations up to date: y                 Lifestyle:                           Healthy Diet:y                          Alcohol Use:n                          Tobacco Use:n                          Regular exercise:y                          Weight concerns:n                               5  Over the past 2 weeks, how often have you been bothered by the following:              Little interest or pleasure in doing things:n              Felling down, depressed or hopeless:n       Current Outpatient Medications   Medication Sig Dispense Refill    aspirin (ECOTRIN LOW STRENGTH) 81 mg EC tablet Take 1 tablet (81 mg total) by mouth 2 (two) times a day 90 tablet 0    buPROPion (WELLBUTRIN SR) 150 mg 12 hr tablet Take 1 tablet (150 mg total) by mouth 2 (two) times a day 60 tablet 5    HYDROcodone-acetaminophen (NORCO) 5-325 mg per tablet Take 1 tablet by mouth every 6 (six) hours as needed for painMax Daily Amount: 4 tablets 30 tablet 0    ibuprofen (MOTRIN) 800 mg tablet Take by mouth every 6 (six) hours as needed for mild pain      LORazepam (ATIVAN) 1 mg tablet Take 1 tablet (1 mg total) by mouth every 8 (eight) hours as needed for anxiety 90 tablet 5    NON FORMULARY Medical marijuana       No current facility-administered medications for this visit        No Known Allergies  Immunization History Administered Date(s) Administered    Influenza Quadrivalent Preservative Free 3 years and older IM 10/21/2015    Tdap 11/07/2011       Patient Care Team:  Lm Nichole MD as PCP - General    Review of Systems   Constitutional: Negative for fatigue, fever and unexpected weight change  HENT: Negative for congestion, sinus pressure and sore throat  Eyes: Negative for visual disturbance  Respiratory: Negative for shortness of breath and wheezing  Cardiovascular: Negative for chest pain and palpitations  Gastrointestinal: Negative for abdominal pain, diarrhea, nausea and vomiting  Physical Exam :  Physical Exam  Constitutional:       General: He is not in acute distress  Appearance: He is well-developed  He is not diaphoretic  HENT:      Right Ear: Tympanic membrane, ear canal and external ear normal  Tympanic membrane is not injected  Left Ear: Tympanic membrane, ear canal and external ear normal  Tympanic membrane is not injected  Nose: Nose normal       Mouth/Throat:      Pharynx: Uvula midline  Eyes:      Conjunctiva/sclera: Conjunctivae normal       Pupils: Pupils are equal, round, and reactive to light  Neck:      Musculoskeletal: Normal range of motion and neck supple  Thyroid: No thyromegaly  Cardiovascular:      Rate and Rhythm: Normal rate and regular rhythm  Heart sounds: Normal heart sounds  No murmur  Pulmonary:      Effort: Pulmonary effort is normal  No respiratory distress  Breath sounds: Normal breath sounds  No wheezing  Lymphadenopathy:      Cervical: No cervical adenopathy  Assessment and Plan:  1  Arthritis of left hip  HYDROcodone-acetaminophen (NORCO) 5-325 mg per tablet   2   Wellness examination         Health Maintenance Due   Topic Date Due    COVID-19 Vaccine (1) Never done    Colorectal Cancer Screening  Never done    Annual Physical  05/10/2020    BMI: Followup Plan  03/03/2021

## 2021-03-22 NOTE — PROGRESS NOTES
[facility]        NAME: Jignesh Singh is a 46 y o  male  : 1970    MRN: [id number]  DATE: 2021  TIME: 10:14 AM    Assessment and Plan   Arthritis of left hip [M16 12]  1  Arthritis of left hip  HYDROcodone-acetaminophen (NORCO) 5-325 mg per tablet         Patient Instructions     There are no Patient Instructions on file for this visit  Chief Complaint     Chief Complaint   Patient presents with    Transition of Care Management     Left hip replacement         History of Present Illness       F/u hip replacement--records rev      Review of Systems   Review of Systems   Constitutional: Negative for fatigue, fever and unexpected weight change  HENT: Negative for congestion, sinus pain and sore throat  Eyes: Negative for visual disturbance  Respiratory: Negative for shortness of breath and wheezing  Cardiovascular: Negative for chest pain and palpitations  Gastrointestinal: Negative for abdominal pain, nausea and vomiting  Musculoskeletal: Negative  Negative for arthralgias and myalgias  Neurological: Negative for syncope, weakness and numbness  Psychiatric/Behavioral: Negative  Negative for confusion, dysphoric mood and suicidal ideas           Current Medications       Current Outpatient Medications:     aspirin (ECOTRIN LOW STRENGTH) 81 mg EC tablet, Take 1 tablet (81 mg total) by mouth 2 (two) times a day, Disp: 90 tablet, Rfl: 0    buPROPion (WELLBUTRIN SR) 150 mg 12 hr tablet, Take 1 tablet (150 mg total) by mouth 2 (two) times a day, Disp: 60 tablet, Rfl: 5    HYDROcodone-acetaminophen (NORCO) 5-325 mg per tablet, Take 1 tablet by mouth every 6 (six) hours as needed for painMax Daily Amount: 4 tablets, Disp: 30 tablet, Rfl: 0    ibuprofen (MOTRIN) 800 mg tablet, Take by mouth every 6 (six) hours as needed for mild pain, Disp: , Rfl:     LORazepam (ATIVAN) 1 mg tablet, Take 1 tablet (1 mg total) by mouth every 8 (eight) hours as needed for anxiety, Disp: 90 tablet, Rfl: 5    NON FORMULARY, Medical marijuana, Disp: , Rfl:     Current Allergies     Allergies as of 03/22/2021    (No Known Allergies)            The following portions of the patient's history were reviewed and updated as appropriate: allergies, current medications, past family history, past medical history, past social history, past surgical history and problem list      Past Medical History:   Diagnosis Date    Anxiety     Arthritis     Back pain     Chronic pain disorder     Claustrophobia     Depression     Hypertension     Wears glasses        Past Surgical History:   Procedure Laterality Date    FL INJECTION LEFT HIP (ARTHROGRAM)  1/12/2021    FOOT CAPSULE RELEASE W/ PERCUTANEOUS HEEL CORD LENGTHENING, TIBIAL TENDON TRANSFER Left     HAND SURGERY Right     tedon repair    HERNIA REPAIR Right     SC TOTAL HIP ARTHROPLASTY Left 3/3/2021    Procedure: ARTHROPLASTY HIP TOTAL ANTERIOR;  Surgeon: Aracelis Aguero MD;  Location:  MAIN OR;  Service: Orthopedics       Family History   Problem Relation Age of Onset    Diabetes Father     Hypertension Father          Medications have been verified  Objective   /80   Pulse 102   Ht 5' 3" (1 6 m)   Wt 73 9 kg (163 lb)   SpO2 98%   BMI 28 87 kg/m²        Physical Exam     Physical Exam  Constitutional:       Appearance: He is well-developed  HENT:      Right Ear: Ear canal normal  Tympanic membrane is not injected  Left Ear: Ear canal normal  Tympanic membrane is not injected  Nose: Nose normal    Eyes:      General:         Right eye: No discharge  Left eye: No discharge  Conjunctiva/sclera: Conjunctivae normal       Pupils: Pupils are equal, round, and reactive to light  Neck:      Musculoskeletal: Normal range of motion and neck supple  Thyroid: No thyromegaly  Cardiovascular:      Rate and Rhythm: Normal rate and regular rhythm  Heart sounds: Normal heart sounds   No murmur  Pulmonary:      Effort: Pulmonary effort is normal  No respiratory distress  Breath sounds: Normal breath sounds  No wheezing  Abdominal:      General: Bowel sounds are normal  There is no distension  Palpations: Abdomen is soft  Tenderness: There is no abdominal tenderness  Musculoskeletal: Normal range of motion  Lymphadenopathy:      Cervical: No cervical adenopathy  Skin:     General: Skin is warm and dry  Neurological:      Mental Status: He is alert and oriented to person, place, and time  He is not disoriented  Sensory: No sensory deficit  Gait: Gait normal       Deep Tendon Reflexes: Reflexes are normal and symmetric  Psychiatric:         Speech: Speech normal          Behavior: Behavior normal          Thought Content:  Thought content normal          Judgment: Judgment normal

## 2021-03-23 ENCOUNTER — OFFICE VISIT (OUTPATIENT)
Dept: PHYSICAL THERAPY | Facility: CLINIC | Age: 51
End: 2021-03-23
Payer: COMMERCIAL

## 2021-03-23 DIAGNOSIS — Z96.642 STATUS POST TOTAL REPLACEMENT OF LEFT HIP: Primary | ICD-10-CM

## 2021-03-23 PROCEDURE — 97112 NEUROMUSCULAR REEDUCATION: CPT

## 2021-03-23 PROCEDURE — 97140 MANUAL THERAPY 1/> REGIONS: CPT

## 2021-03-23 PROCEDURE — 97110 THERAPEUTIC EXERCISES: CPT

## 2021-03-23 NOTE — PROGRESS NOTES
Daily Note     Today's date: 3/23/2021  Patient name: Minerva Cowan  : 1970  MRN: [id number]  Referring provider: Shereen Arellano MD  Dx:   Encounter Diagnosis     ICD-10-CM    1  Status post total replacement of left hip  Z96 642                   Subjective: Pt reports being sore in the hip around the incision  Objective: See treatment diary below      Assessment: Tolerated treatment well  Patient demonstrated fatigue post treatment and would benefit from continued PT for cont'd strengthening  Plan: Continue per plan of care  Progress treatment as tolerated         Dx: s/p L MIREYA ( 3/3/21)  Anterior approach  EPOC: 21  CO-MORBIDITIES:  PERSONAL FACTORS: not working  Precautions: cerebral palsy      Manuals 3/11 3/23           L hip manual stretching  JK 13'           MFR L hip  JK                                     Neuro Re-Ed             Mini squats  10           sidestepping  2 laps           Step ups FW 4" 4" 10           Step ups lat 4" 4"x10           DLS: alt march 10 15                                     Ther Ex             Seated ball squeeze 10 x5" 10x5"           LAQ 10 x5" 10x5"           Supine SLR AA 10x2 AA           bridges  10x5"           clamshells 10 10 L           Quad sets 10x10" 10x10"           Supine lying             Heel slides             HS stretch  3x20" seated                                     Gait Training                                       Modalities             Cp post tx

## 2021-03-25 ENCOUNTER — APPOINTMENT (OUTPATIENT)
Dept: PHYSICAL THERAPY | Facility: CLINIC | Age: 51
End: 2021-03-25
Payer: COMMERCIAL

## 2021-03-30 ENCOUNTER — OFFICE VISIT (OUTPATIENT)
Dept: PHYSICAL THERAPY | Facility: CLINIC | Age: 51
End: 2021-03-30
Payer: COMMERCIAL

## 2021-03-30 DIAGNOSIS — M16.12 PRIMARY OSTEOARTHRITIS OF LEFT HIP: ICD-10-CM

## 2021-03-30 DIAGNOSIS — Z96.642 STATUS POST TOTAL REPLACEMENT OF LEFT HIP: Primary | ICD-10-CM

## 2021-03-30 PROCEDURE — 97110 THERAPEUTIC EXERCISES: CPT

## 2021-03-30 PROCEDURE — 97140 MANUAL THERAPY 1/> REGIONS: CPT

## 2021-03-30 PROCEDURE — 97112 NEUROMUSCULAR REEDUCATION: CPT

## 2021-03-30 NOTE — PROGRESS NOTES
Daily Note     Today's date: 3/30/2021  Patient name: Thalia Kennedy  : 1970  MRN: [id number]  Referring provider: Wilmar Castellanos MD  Dx:   Encounter Diagnosis     ICD-10-CM    1  Status post total replacement of left hip  Z96 642    2  Primary osteoarthritis of left hip  M16 12                   Subjective: Pt c/o tightness/pain in the hip flexor/glut med/ and prox HS  Pt reports he fatigues so quickly and does not sleep well due to cramping/tone in the mm's  Objective: See treatment diary below      Assessment: Tolerated treatment well w/ ex's w/ frequent rests  Patient demonstrated fatigue post treatment and would benefit from continued PT for cont'd work on strength and hip ROM  Pt educated his body is still recovering from surgery and he has to fight the tone which seems to exhaust him  Plan: Continue per plan of care  Progress treatment as tolerated         Dx: s/p L MIREYA ( 3/3/21)  Anterior approach  EPOC: 21  CO-MORBIDITIES:  PERSONAL FACTORS: not working  Precautions: cerebral palsy      Manuals 3/11 3/23 3/30          L hip manual stretching  JK 13' JK          MFR L hip  JK JK                          15'          Neuro Re-Ed             Mini squats  10 10          sidestepping  2 laps 3 laps          Step ups FW 4" 4" 10 4" x10          Step ups lat 4" 4"x10 4" 10          DLS: alt march 10 15 15                                    Ther Ex             Seated ball squeeze 10 x5" 10x5" 10x5"          LAQ 10 x5" 10x5" 10x5"          Supine SLR AA 10x2 AA 10 indep          bridges  10x5" 10          clamshells 10 10 L 10 L          Quad sets 10x10" 10x10" 10"x10          Supine lying             Heel slides   10          HS stretch  3x20" seated 3x20"x seated          QL str seated   3x20"                       Gait Training                                       Modalities             Cp post tx

## 2021-03-31 ENCOUNTER — OFFICE VISIT (OUTPATIENT)
Dept: FAMILY MEDICINE CLINIC | Facility: CLINIC | Age: 51
End: 2021-03-31
Payer: COMMERCIAL

## 2021-03-31 DIAGNOSIS — R52 PAIN: Primary | ICD-10-CM

## 2021-03-31 DIAGNOSIS — M16.12 ARTHRITIS OF LEFT HIP: ICD-10-CM

## 2021-03-31 PROCEDURE — 99213 OFFICE O/P EST LOW 20 MIN: CPT | Performed by: FAMILY MEDICINE

## 2021-03-31 RX ORDER — HYDROCODONE BITARTRATE AND ACETAMINOPHEN 5; 325 MG/1; MG/1
1 TABLET ORAL EVERY 6 HOURS PRN
Qty: 30 TABLET | Refills: 0 | Status: SHIPPED | OUTPATIENT
Start: 2021-03-31 | End: 2022-04-06

## 2021-03-31 NOTE — PROGRESS NOTES
[facility]        NAME: Nola Edwards is a 46 y o  male  : 1970    MRN: [id number]  DATE: 2021  TIME: 11:24 AM    Assessment and Plan   Pain [R52]  1  Pain     2  Arthritis of left hip  HYDROcodone-acetaminophen (NORCO) 5-325 mg per tablet         Patient Instructions     Patient Instructions   Get 2nd PT opinion          Chief Complaint   No chief complaint on file  History of Present Illness       C/o incr pain s/p PT      Review of Systems   Review of Systems   Constitutional: Negative for fatigue, fever and unexpected weight change  HENT: Negative for congestion, sinus pain and sore throat  Eyes: Negative for visual disturbance  Respiratory: Negative for shortness of breath and wheezing  Cardiovascular: Negative for chest pain and palpitations  Gastrointestinal: Negative for abdominal pain, nausea and vomiting  Musculoskeletal: Negative  Negative for arthralgias and myalgias  Neurological: Negative for syncope, weakness and numbness  Psychiatric/Behavioral: Negative  Negative for confusion, dysphoric mood and suicidal ideas           Current Medications       Current Outpatient Medications:     aspirin (ECOTRIN LOW STRENGTH) 81 mg EC tablet, Take 1 tablet (81 mg total) by mouth 2 (two) times a day, Disp: 90 tablet, Rfl: 0    buPROPion (WELLBUTRIN SR) 150 mg 12 hr tablet, Take 1 tablet (150 mg total) by mouth 2 (two) times a day, Disp: 60 tablet, Rfl: 5    HYDROcodone-acetaminophen (NORCO) 5-325 mg per tablet, Take 1 tablet by mouth every 6 (six) hours as needed for painMax Daily Amount: 4 tablets, Disp: 30 tablet, Rfl: 0    ibuprofen (MOTRIN) 800 mg tablet, Take by mouth every 6 (six) hours as needed for mild pain, Disp: , Rfl:     LORazepam (ATIVAN) 1 mg tablet, Take 1 tablet (1 mg total) by mouth every 8 (eight) hours as needed for anxiety, Disp: 90 tablet, Rfl: 5    NON FORMULARY, Medical marijuana, Disp: , Rfl:     Current Allergies     Allergies as of 03/31/2021    (No Known Allergies)            The following portions of the patient's history were reviewed and updated as appropriate: allergies, current medications, past family history, past medical history, past social history, past surgical history and problem list      Past Medical History:   Diagnosis Date    Anxiety     Arthritis     Back pain     Chronic pain disorder     Claustrophobia     Depression     Hypertension     Wears glasses        Past Surgical History:   Procedure Laterality Date    FL INJECTION LEFT HIP (ARTHROGRAM)  1/12/2021    FOOT CAPSULE RELEASE W/ PERCUTANEOUS HEEL CORD LENGTHENING, TIBIAL TENDON TRANSFER Left     HAND SURGERY Right     tedon repair    HERNIA REPAIR Right     [place] TOTAL HIP ARTHROPLASTY Left 3/3/2021    Procedure: ARTHROPLASTY HIP TOTAL ANTERIOR;  Surgeon: Mu Ball MD;  Location:  MAIN OR;  Service: Orthopedics       Family History   Problem Relation Age of Onset    Diabetes Father     Hypertension Father          Medications have been verified  Objective   There were no vitals taken for this visit  Physical Exam     Physical Exam  Constitutional:       Appearance: He is well-developed  HENT:      Right Ear: Ear canal normal  Tympanic membrane is not injected  Left Ear: Ear canal normal  Tympanic membrane is not injected  Nose: Nose normal    Eyes:      General:         Right eye: No discharge  Left eye: No discharge  Conjunctiva/sclera: Conjunctivae normal       Pupils: Pupils are equal, round, and reactive to light  Neck:      Musculoskeletal: Normal range of motion and neck supple  Thyroid: No thyromegaly  Cardiovascular:      Rate and Rhythm: Normal rate and regular rhythm  Heart sounds: Normal heart sounds  No murmur  Pulmonary:      Effort: Pulmonary effort is normal  No respiratory distress  Breath sounds: Normal breath sounds  No wheezing     Abdominal: General: Bowel sounds are normal  There is no distension  Palpations: Abdomen is soft  Tenderness: There is no abdominal tenderness  Musculoskeletal: Normal range of motion  Lymphadenopathy:      Cervical: No cervical adenopathy  Skin:     General: Skin is warm and dry  Neurological:      Mental Status: He is alert and oriented to person, place, and time  He is not disoriented  Sensory: No sensory deficit  Gait: Gait normal       Deep Tendon Reflexes: Reflexes are normal and symmetric  Psychiatric:         Speech: Speech normal          Behavior: Behavior normal          Thought Content:  Thought content normal          Judgment: Judgment normal

## 2021-04-01 ENCOUNTER — APPOINTMENT (OUTPATIENT)
Dept: PHYSICAL THERAPY | Facility: CLINIC | Age: 51
End: 2021-04-01
Payer: COMMERCIAL

## 2021-04-06 ENCOUNTER — OFFICE VISIT (OUTPATIENT)
Dept: PHYSICAL THERAPY | Facility: CLINIC | Age: 51
End: 2021-04-06
Payer: COMMERCIAL

## 2021-04-06 DIAGNOSIS — Z96.642 STATUS POST TOTAL REPLACEMENT OF LEFT HIP: Primary | ICD-10-CM

## 2021-04-06 DIAGNOSIS — M16.12 PRIMARY OSTEOARTHRITIS OF LEFT HIP: ICD-10-CM

## 2021-04-06 DIAGNOSIS — Z23 ENCOUNTER FOR IMMUNIZATION: ICD-10-CM

## 2021-04-06 PROCEDURE — 97110 THERAPEUTIC EXERCISES: CPT

## 2021-04-06 PROCEDURE — 97112 NEUROMUSCULAR REEDUCATION: CPT

## 2021-04-06 PROCEDURE — 97140 MANUAL THERAPY 1/> REGIONS: CPT

## 2021-04-06 NOTE — PROGRESS NOTES
Daily Note     Today's date: 2021  Patient name: Amauri Rodríguez  : 1970  MRN: [id number]  Referring provider: Bryson Peters MD  Dx:   Encounter Diagnosis     ICD-10-CM    1  Status post total replacement of left hip  Z96 642    2  Primary osteoarthritis of left hip  M16 12        Start Time: 7564  Stop Time: 1230  Total time in clinic (min): 45 minutes     Subjective: Patient notes increased soreness prior to therapy secondary to standing while completing dishes at home and performing side steps  Objective: See treatment diary below    Assessment: Patient has no pain with PROM  Glute med soreness apparent during hip strengthening TE  Continue to progress strengthening as able  Plan: Continue per plan of care  Progress treatment as tolerated         Dx: s/p L MIREYA ( 3/3/21)  Anterior approach  EPOC: 21  CO-MORBIDITIES:  PERSONAL FACTORS: not working  Precautions: cerebral palsy    Manuals 3/11 3/23 3/30 4/6         L hip manual stretching  JK 13' JK JM         MFR L hip  JK JK FABRICE                         15' 15'         Neuro Re-Ed             Mini squats  10 10 10         sidestepping  2 laps 3 laps 3 laps         Step ups FW 4" 4" 10 4" x10 4"x15         Step ups lat 4" 4"x10 4" 10 4"x15         DLS: alt march 10 15 15 15                                   Ther Ex             Seated ball squeeze 10 x5" 10x5" 10x5" 10x5"         LAQ 10 x5" 10x5" 10x5" 10x5"         Supine SLR AA 10x2 AA 10 indep x4 indep fatigue         bridges  10x5" 10 10x5"         clamshells 10 10 L 10 L 10 L         Quad sets 10x10" 10x10" 10"x10 10"x10         Supine lying             Heel slides   10 10         HS stretch  3x20" seated  3x20"  seated  3x20"    seated         QL str seated   3x20" 3x20"                      Gait Training                                       Modalities             Cp post tx

## 2021-04-07 ENCOUNTER — TELEPHONE (OUTPATIENT)
Dept: FAMILY MEDICINE CLINIC | Facility: CLINIC | Age: 51
End: 2021-04-07

## 2021-04-07 NOTE — TELEPHONE ENCOUNTER
Valid  Referral #: [id number]  Effective: 04/07/2021  Expires: 07/05/2021    Referral [id number] has been successfully submitted  [facility]   [address]   Leilani Suárez [id number]   PATIENT'S INSURANCE  Member ID: [id number]  PRIMARY CARE PHYSICIAN  [employer] [facility]   NPI: [id number]   From  [employer] [facility]   NPI: [id number]  [address]   [place], [address]   To  [place] [address]  NPI: [id number]  [id number], [place], [address] [address]   ,   Service Type: Medical Care (Consult and Treat)   Place of Service: Office   Note to Patient   This referral is valid at any location for the above group  Clinical Information    Diagnoses (1)     Diagnosis    1  M16 12 - Unilateral primary osteoarthritis, left hip    Disclaimer:   [employer] and its Affiliates ([facility]) will pay for only those services covered under the [facility] Contract which are specifically noted and requested by the PCP or OBGYN on the referral  If any additional services, testing, or follow-up care are required, the PCP or OBGYN must be contacted prior to the delivery of such additional services for written approval on a separate referral  Non-referred services will not be covered by [facility]  Benefits are underwritten or administered by [facility], a subsidiary of [employer], which are independent licensees of the [employer] and [employer]

## 2021-04-08 ENCOUNTER — OFFICE VISIT (OUTPATIENT)
Dept: PHYSICAL THERAPY | Facility: CLINIC | Age: 51
End: 2021-04-08
Payer: COMMERCIAL

## 2021-04-08 DIAGNOSIS — Z96.642 STATUS POST TOTAL REPLACEMENT OF LEFT HIP: Primary | ICD-10-CM

## 2021-04-08 DIAGNOSIS — M16.12 PRIMARY OSTEOARTHRITIS OF LEFT HIP: ICD-10-CM

## 2021-04-08 PROCEDURE — 97110 THERAPEUTIC EXERCISES: CPT

## 2021-04-08 PROCEDURE — 97112 NEUROMUSCULAR REEDUCATION: CPT

## 2021-04-08 PROCEDURE — 97140 MANUAL THERAPY 1/> REGIONS: CPT

## 2021-04-08 NOTE — PROGRESS NOTES
Daily Note     Today's date: 2021  Patient name: Anali Hale  : 1970  MRN: [id number]  Referring provider: Tim Lisa MD  Dx:   Encounter Diagnosis     ICD-10-CM    1  Status post total replacement of left hip  Z96 642    2  Primary osteoarthritis of left hip  M16 12        Start Time: 1102  Stop Time: 1145  Total time in clinic (min): 43 minutes     Subjective: Patient notes sharp pain in his L lateral hip the other night  This pain has since subsided  Continued general soreness at the lateral aspect of his hip  Has a follow up tomorrow with his doctor  Objective: See treatment diary below    Assessment: Patient has no pain with PROM  Glute med and ITB continued to be tender  However, he responds well to manual therapy which helps decrease his soreness and improve tissue mobility  Progress as able  Plan: Continue per plan of care  Progress treatment as tolerated         Dx: s/p L MIREYA ( 3/3/21)  Anterior approach  EPOC: 21  CO-MORBIDITIES:  PERSONAL FACTORS: not working  Precautions: cerebral palsy    Manuals 3/11 3/23 3/30 4/6 4/8        L hip manual stretching  JK 13' JK JM         MFR L hip  JK JK JM JM ITB                        15' 15' 15'        Neuro Re-Ed             Mini squats  10 10 10 10        sidestepping  2 laps 3 laps 3 laps 3 laps        Step ups FW 4" 4" 10 4" x10 4"x15 4"x15        Step ups lat 4" 4"x10 4" 10 4"x15 4"x15        DLS: alt march 10 15 15 15 15                                  Ther Ex             Seated ball squeeze 10 x5" 10x5" 10x5" 10x5" 10x5"        LAQ 10 x5" 10x5" 10x5" 10x5" 10x5"        Supine SLR AA 10x2 AA 10 indep x4 indep fatigue x7 indep fatigue        bridges  10x5" 10 10x5" 10x5"        clamshells 10 10 L 10 L 10 L 10 L        Quad sets 10x10" 10x10" 10"x10 10"x10 10"x10        Supine lying             Heel slides   10 10 10        HS stretch  3x20" seated  3x20"  seated  3x20"    seated 3x20"    seated        QL str seated   3x20" 3x20" 3x20"                     Gait Training                                       Modalities             Cp post tx

## 2021-04-09 ENCOUNTER — APPOINTMENT (OUTPATIENT)
Dept: RADIOLOGY | Facility: CLINIC | Age: 51
End: 2021-04-09
Payer: COMMERCIAL

## 2021-04-09 ENCOUNTER — OFFICE VISIT (OUTPATIENT)
Dept: OBGYN CLINIC | Facility: CLINIC | Age: 51
End: 2021-04-09

## 2021-04-09 VITALS
DIASTOLIC BLOOD PRESSURE: 68 MMHG | WEIGHT: 163 LBS | BODY MASS INDEX: 28.88 KG/M2 | HEIGHT: 63 IN | SYSTOLIC BLOOD PRESSURE: 130 MMHG

## 2021-04-09 DIAGNOSIS — Z96.642 AFTERCARE FOLLOWING LEFT HIP JOINT REPLACEMENT SURGERY: Primary | ICD-10-CM

## 2021-04-09 DIAGNOSIS — Z96.642 AFTERCARE FOLLOWING LEFT HIP JOINT REPLACEMENT SURGERY: ICD-10-CM

## 2021-04-09 DIAGNOSIS — Z47.1 AFTERCARE FOLLOWING LEFT HIP JOINT REPLACEMENT SURGERY: ICD-10-CM

## 2021-04-09 DIAGNOSIS — Z47.1 AFTERCARE FOLLOWING LEFT HIP JOINT REPLACEMENT SURGERY: Primary | ICD-10-CM

## 2021-04-09 PROCEDURE — 99024 POSTOP FOLLOW-UP VISIT: CPT | Performed by: ORTHOPAEDIC SURGERY

## 2021-04-09 PROCEDURE — 73502 X-RAY EXAM HIP UNI 2-3 VIEWS: CPT

## 2021-04-09 PROCEDURE — 3008F BODY MASS INDEX DOCD: CPT | Performed by: FAMILY MEDICINE

## 2021-04-09 RX ORDER — AMOXICILLIN 500 MG/1
2000 CAPSULE ORAL ONCE
Qty: 4 CAPSULE | Refills: 5 | Status: SHIPPED | OUTPATIENT
Start: 2021-04-09 | End: 2021-04-09

## 2021-04-09 NOTE — PROGRESS NOTES
PROGRESS NOTE:  Post-operative Visit Total Hip Replacement  Date of Surgery: 3/3/21    SUBJECTIVE:  Patient returns for follow-up, status post L DAA MIREYA     The pain is mild and moderate  Medication for pain is tylenol, NSAID and narcotic   - narcotic prn (mostly after therapy)    Gait: antalgic  Assistive device:  cane  Return to work: No    Sabrina Swapnil presents to office  He is doing very well  Continues to use cane  Feels legs are now even, maybe slightly shorter on operative leg, but much closer to normal compared to preop  Feels his posture is improved  OBJECTIVE:  Hip wounds are appropriate  No erythema, drainage or obvious signs of infection  Neurovascular status is normal    Limp: mild and moderate  Trace hip flexor weakness but grossly intact  Extension: 0  Flexion: 120  Internal rotation: 10  External rotation: 10  Leg lengths: equal      Sensation Intact to Light Touch in Sural, Saphenous, Tibial, Superficial Peroneal, and Deep Peroneal Nerve Distribution  Motor function 5 out of 5 strength in Gastrocnemius, Soleus, Extensor Hallucis Longus, and Flexor Hallucis Longus Muscles  He is not firing TA (he reports this is baseline due to CP  Extremity Warm and Well Perfused  Brisk Capillary Refill in Toes  RADIOGRAPHS:  Radiographs demonstrate MIREYA implants in place with no obvious complication  Appear well fixed  ASSESSMENT & PLAN:  - Continue standard postop total hip protocol, outpatient physical therapy, and wean pain medication     - Reassurance is given that current symptom complex is normal     - OK to massage the scar, gently and progressively for sensitivity     - OK to wean off ambulatory aids  - OK to submerge wound for baths/water aerobics if desired  - Follow-up in 3 months, without XR and for clinical check  Discussed with patient that should any issues/questions/concerns arise they should phone the office    Amoxicillin script called in      [address] [place] [address]   Nicholas Lemus MD  Adult Reconstruction  Department of [place] [address]  10:54 AM

## 2021-04-13 ENCOUNTER — EVALUATION (OUTPATIENT)
Dept: PHYSICAL THERAPY | Facility: CLINIC | Age: 51
End: 2021-04-13
Payer: COMMERCIAL

## 2021-04-13 DIAGNOSIS — M16.12 PRIMARY OSTEOARTHRITIS OF LEFT HIP: ICD-10-CM

## 2021-04-13 DIAGNOSIS — Z96.642 STATUS POST TOTAL REPLACEMENT OF LEFT HIP: Primary | ICD-10-CM

## 2021-04-13 PROCEDURE — 97140 MANUAL THERAPY 1/> REGIONS: CPT | Performed by: PHYSICAL THERAPIST

## 2021-04-13 PROCEDURE — 97110 THERAPEUTIC EXERCISES: CPT | Performed by: PHYSICAL THERAPIST

## 2021-04-13 PROCEDURE — 97112 NEUROMUSCULAR REEDUCATION: CPT | Performed by: PHYSICAL THERAPIST

## 2021-04-13 NOTE — PROGRESS NOTES
PT Re-Evaluation     Today's date: 2021  Patient name: Luba Atkins  : 1970  MRN: [id number]  Referring provider: Rolando Finnegan MD  Dx:   Encounter Diagnosis     ICD-10-CM    1  Status post total replacement of left hip  Z96 642    2  Primary osteoarthritis of left hip  M16 12                   Assessment  Assessment details: Since starting skilled PT, pain levels are decreased in general, L hip ROM and strength is improving, with gradual functional progress  Recommend pt continue skilled PT  Impairments: abnormal gait, abnormal muscle tone, abnormal or restricted ROM, activity intolerance, impaired balance, impaired physical strength and pain with function  Barriers to therapy: 1  History of cerebral palsy  Understanding of Dx/Px/POC: good   Prognosis: good    Goals  STG's ( 3-4 weeks)  1  Pt will be independent in HEP-met  2  Pt will have decreased hip pain rated 2-3/10 with activity-not met  LTG's ( 6- 8 weeks)  1  Improve FOTO score by 8-10 points- met  2  Pt will be able to walk community distances without an assistive device-not met  3  Pt will be able to go up and down the steps reciprocally-met  4  Improve L hip strength by 1/2 grade-met    Plan  Patient would benefit from: skilled physical therapy  Planned modality interventions: cryotherapy  Planned therapy interventions: manual therapy, neuromuscular re-education, balance, therapeutic activities, stretching, strengthening, therapeutic exercise, home exercise program, functional ROM exercises and flexibility  Frequency: 2x week  Duration in weeks: 6  Plan of Care beginning date: 2021  Plan of Care expiration date: 2021  Treatment plan discussed with: patient        Subjective Evaluation    History of Present Illness  Date of surgery: 3/3/2021  Mechanism of injury: surgery  Mechanism of injury: I E: Pt underwent L MIREYA anterior approach on 3/3/21  Pt also had a leg lengthening procedure 2* cerebral palsy diagnosis   Pt is able to walk inside his home with B axillary crutches  Pt is going the steps one at a time  Pt is able to stand for about 20-30 minutes with crutches  Pt's spouse is helping him put on his shoes and socks  21: Pt walks with a SPC or without an assistive device  Pt has not tried to walk community distances  Pt continues to have difficulty putting on shoes  Pt has purchased a long shoe horn  Pt goes up the steps reciprocally with a low set of stairs  Pt continues to have pain with sleeping activities  Work: not currently working; needs to be able to climb [employer]: traveling, beach, hiking, biking  Gait: pt ambulates with B axillary crutches with decreased weight bearing L LE    Pain  At best pain ratin  At worst pain ratin  Location: L hip  Quality: dull ache  Relieving factors: ice and medications    Social Support  Steps to enter house: yes  8  Stairs in house: no   Lives in: [facility]  Lives with: spouse    Employment status: not working  Patient Goals  Patient goals for therapy: return to work and return to sport/leisure activities  Patient goal: bike riding, hiking, walking, working        Objective     Neurological Testing     Sensation     Hip   Left Hip   Intact: light touch    Right Hip   Intact: light touch    Reflexes   Left   Patellar (L4): normal (2+)    Right   Patellar (L4): normal (2+)  Achilles (S1): normal (2+)    Comments   Left Achilles (S1): abrnormal; L ankle inverts and plantar flexes    Active Range of Motion   Left Hip   Flexion: 100 degrees with pain  Extension: 5 degrees   Abduction: 15 degrees with pain  External rotation (90/90): 15 degrees with pain  Internal rotation (90/90): 15 degrees with pain    Right Hip   Normal active range of motion    Additional Active Range of Motion Details  (+) L anterior and lateral hip TTP with mild warmth to touch  (+) L LE muscular atrophy upon visual inspection 2* CP  Pt has increased pulling sx with supine positioning and is unable to lay with hip in extended position    Strength/Myotome Testing     Left Hip   Planes of Motion   Flexion: 4  Left hip extension strength: NT   Abduction: 4-  Internal rotation: 4-    Isolated Muscles   Gluteus violet: 4-    Right Hip   Normal muscle strength    Left Knee   Flexion: 4+  Extension: 4+    Additional Strength Details  L ankle df MMT: 1/5 with minimal mvt          Dx: s/p L MIREYA ( 3/3/21)  Anterior approach  EPOC: 4/22/21  CO-MORBIDITIES:  PERSONAL FACTORS: not working  Precautions: cerebral palsy    Manuals 3/11 3/23 3/30 4/6 4/8 4/13       L hip manual stretching  JK 13' JK [facility]       MFR L hip  JK JK [facility]        Progress note      th          13' 15' 15' 15'       Neuro Re-Ed             Mini squats  10 10 10 10 10       sidestepping  2 laps 3 laps 3 laps 3 laps 3 laps       Step ups FW 4" 4" 10 4" x10 4"x15 4"x15 4" 15       Step ups lat 4" 4"x10 4" 10 4"x15 4"x15 4" 15       DLS: alt march 10 15 15 15 15 15                                 Ther Ex             Seated ball squeeze 10 x5" 10x5" 10x5" 10x5" 10x5" 10x5"       LAQ 10 x5" 10x5" 10x5" 10x5" 10x5" 10x 5"       Supine SLR AA 10x2 AA 10 indep x4 indep fatigue x7 indep fatigue x9 ind fatgiue       bridges  10x5" 10 10x5" 10x5" 10x5"       clamshells 10 10 L 10 L 10 L 10 L        Quad sets 10x10" 10x10" 10"x10 10"x10 10"x10        Standing gastroc stretch      1' L       Heel slides   10 10 10        HS stretch  3x20" seated  3x20"  seated  3x20"    seated 3x20"    seated        QL str seated   3x20" 3x20" 3x20"        S/l L hip abduction      10       Prone on elbows      1'       Standing hip ext      10 L       Standing hip abduction      10 L       Modalities             Cp post tx

## 2021-04-15 ENCOUNTER — OFFICE VISIT (OUTPATIENT)
Dept: PHYSICAL THERAPY | Facility: CLINIC | Age: 51
End: 2021-04-15
Payer: COMMERCIAL

## 2021-04-15 DIAGNOSIS — M16.12 PRIMARY OSTEOARTHRITIS OF LEFT HIP: ICD-10-CM

## 2021-04-15 DIAGNOSIS — Z96.642 STATUS POST TOTAL REPLACEMENT OF LEFT HIP: Primary | ICD-10-CM

## 2021-04-15 PROCEDURE — 97110 THERAPEUTIC EXERCISES: CPT

## 2021-04-15 PROCEDURE — 97112 NEUROMUSCULAR REEDUCATION: CPT

## 2021-04-15 NOTE — PROGRESS NOTES
Daily Note     Today's date: 4/15/2021  Patient name: José Kwong  : 1970  MRN: [id number]  Referring provider: Lui Arechiga MD  Dx:   Encounter Diagnosis     ICD-10-CM    1  Status post total replacement of left hip  Z96 642    2  Primary osteoarthritis of left hip  M16 12        Start Time: 1100  Stop Time: 1145  Total time in clinic (min): 45 minutes    Subjective: Patient states, "I've been having trouble sleeping secondary to pain/spasms at night  I have been pushing myself during the days with work and walking"  Objective: See treatment diary below    Assessment: Patient demonstrated good form during assigned TE  Continues to quickly fatigue with exercises  TTP glute med/max  Plan: Continue per plan of care        Dx: s/p L MIREYA ( 3/3/21)  Anterior approach  EPOC: 21  CO-MORBIDITIES:  PERSONAL FACTORS: not working  Precautions: cerebral palsy    Manuals 3/11 3/23 3/30 4/6 4/8 4/13 4/15      L hip manual stretching  JK  K [facility]      MFR L hip  JK [facility]      Progress note      ' 15' 15' 15' 15'      Neuro Re-Ed             Mini squats  10 10 10 10 10 10      sidestepping  2 laps 3 laps 3 laps 3 laps 3 laps       Step ups FW 4" 4" 10 4" x10 4"x15 4"x15 4" 15 4"x15      Step ups lat 4" 4"x10 4" 10 4"x15 4"x15 4" 15 4"x15      DLS: alt march 10 15 15 15 15 15 15                                Ther Ex             Seated ball squeeze 10 x5" 10x5" 10x5" 10x5" 10x5" 10x5" 10x5"      LAQ 10 x5" 10x5" 10x5" 10x5" 10x5" 10x 5" 10x5"      Supine SLR AA 10x2 AA 10 indep x4 indep fatigue x7 indep fatigue x9 ind fatgiue x10      bridges  10x5" 10 10x5" 10x5" 10x5" 10x5"      clamshells 10 10 L 10 L 10 L 10 L        Quad sets 10x10" 10x10" 10"x10 10"x10 10"x10        Standing gastroc stretch      1' L 1' L      Heel slides   10 10 10        HS stretch  3x20" seated  3x20"  seated  3x20"    seated 3x20"    seated        QL str seated   3x20" 3x20" 3x20"        S/l L hip abduction      10 10      Prone on elbows        1'   1'      Standing hip ext      10 L 10 L      Standing hip abduction      10 L 10 L      Modalities             Cp post tx

## 2021-04-20 ENCOUNTER — OFFICE VISIT (OUTPATIENT)
Dept: PHYSICAL THERAPY | Facility: CLINIC | Age: 51
End: 2021-04-20
Payer: COMMERCIAL

## 2021-04-20 DIAGNOSIS — M16.12 PRIMARY OSTEOARTHRITIS OF LEFT HIP: ICD-10-CM

## 2021-04-20 DIAGNOSIS — Z96.642 STATUS POST TOTAL REPLACEMENT OF LEFT HIP: Primary | ICD-10-CM

## 2021-04-20 PROCEDURE — 97112 NEUROMUSCULAR REEDUCATION: CPT | Performed by: PHYSICAL THERAPIST

## 2021-04-20 PROCEDURE — 97140 MANUAL THERAPY 1/> REGIONS: CPT | Performed by: PHYSICAL THERAPIST

## 2021-04-20 PROCEDURE — 97110 THERAPEUTIC EXERCISES: CPT | Performed by: PHYSICAL THERAPIST

## 2021-04-20 NOTE — PROGRESS NOTES
Daily Note     Today's date: 2021  Patient name: Anali Hale  : 1970  MRN: [id number]  Referring provider: Tim Lisa MD  Dx:   Encounter Diagnosis     ICD-10-CM    1  Status post total replacement of left hip  Z96 642    2  Primary osteoarthritis of left hip  M16 12                   Subjective: Pt reports 5/10 pain levels upon arrival to therapy  Pt had increased difficulty sleeping last night  Pt had a rough day on  2* trying to do to much over the weekend  Pt is walking with SPC today      Objective: See treatment diary below      Assessment: Tolerated treatment well  Patient exhibited good technique with therapeutic exercises  Pt is challenged by s/l hip abduction  Issued OTB for home use with [facility]  Therapist stressed the importance of strengthening his L hip in NWB position to be able to withstand the challenges of weight bearing activities  Plan: Continue per plan of care        Dx: s/p L MIREYA ( 3/3/21)  Anterior approach  EPOC: 21  CO-MORBIDITIES:  PERSONAL FACTORS: not working  Precautions: cerebral palsy    Manuals 3/11 3/23 3/30 4/6 4/8 4/13 4/15 4/20     L hip manual stretching  JK 13' JK [facility]     MFR L hip  JK JK [facility]     Progress note      th          13' 15' 15' 15' 15' 15'     Neuro Re-Ed             Mini squats  10 10 10 10 10 10 10     sidestepping  2 laps 3 laps 3 laps 3 laps 3 laps  2 laps OTB     Step ups FW 4" 4" 10 4" x10 4"x15 4"x15 4" 15 4"x15 6"x10     Step ups lat 4" 4"x10 4" 10 4"x15 4"x15 4" 15 4"x15 6"x10     DLS: alt march 10 15 15 15 15 15 15 15 stand     DLS: supine alt march        10 supine                               Ther Ex             bike        5'     Seated ball squeeze 10 x5" 10x5" 10x5" 10x5" 10x5" 10x5" 10x5" 10x10"     LAQ 10 x5" 10x5" 10x5" 10x5" 10x5" 10x 5" 10x5" 10x10" 1 5#     DLS: Supine SLR AA 35F1 AA 10 indep x4 indep fatigue x7 indep fatigue x9 ind fatgiue x10 x10      bridges  10x5" 10 10x5" 10x5" 10x5" 10x5" 10x10"     clamshells 10 10 L 10 L 10 L 10 L   10x5" OTB     Quad sets 10x10" 10x10" 10"x10 10"x10 10"x10        Standing gastroc stretch      1' L 1' L 1' L     Seated hip IR/ER AROM   10 10 10   10 ea     HS stretch  3x20" seated  3x20"  seated  3x20"    seated 3x20"    seated   3x20"     QL str seated   3x20" 3x20" 3x20"        S/l L hip abduction      10 10 2x5     Prone on elbows        1'   1' 1'     Standing hip ext      10 L 10 L 10 L     Standing hip abduction      10 L 10 L 10 L     Modalities             Cp post tx

## 2021-04-22 ENCOUNTER — OFFICE VISIT (OUTPATIENT)
Dept: PHYSICAL THERAPY | Facility: CLINIC | Age: 51
End: 2021-04-22
Payer: COMMERCIAL

## 2021-04-22 DIAGNOSIS — M16.12 PRIMARY OSTEOARTHRITIS OF LEFT HIP: ICD-10-CM

## 2021-04-22 DIAGNOSIS — Z96.642 STATUS POST TOTAL REPLACEMENT OF LEFT HIP: Primary | ICD-10-CM

## 2021-04-22 PROCEDURE — 97112 NEUROMUSCULAR REEDUCATION: CPT

## 2021-04-22 PROCEDURE — 97110 THERAPEUTIC EXERCISES: CPT

## 2021-04-22 NOTE — PROGRESS NOTES
Daily Note     Today's date: 2021  Patient name: Greg Zavala  : 1970  MRN: [id number]  Referring provider: Ambar Suh MD  Dx:   Encounter Diagnosis     ICD-10-CM    1  Status post total replacement of left hip  Z96 642    2  Primary osteoarthritis of left hip  M16 12        Start Time: 1100  Stop Time: 1145  Total time in clinic (min): 45 minutes    Subjective: Patient notes "2/10" pain prior to therapy  Feeling much better than last visit  Patient presents with no AD this visit and able to wear sneakers (has been wearing slippers)    Objective: See treatment diary below    Assessment: Tolerated treatment well  Patient exhibited good technique with therapeutic exercises  Patient recovers well from soreness after over working his L hip over the weekend  Patient showing improvements with active IR/ER  Patient given piriformis stretch to help with ROM and tenderness in his glute/piriformis  Plan: Continue per plan of care        Dx: s/p L MIREYA ( 3/3/21)  Anterior approach  EPOC: 21  CO-MORBIDITIES:  PERSONAL FACTORS: not working  Precautions: cerebral palsy    Manuals 3/11 3/23 3/30 4/6 4/8 4/13 4/15 4/20 4/22    L hip manual stretching  JK  JK [facility]    MFR L hip  JK ERIC [facility]    Progress note      ' 15' 15' 15' 15' 15' 15'    Neuro Re-Ed             Mini squats  10 10 10 10 10 10 10 10    sidestepping  2 laps 3 laps 3 laps 3 laps 3 laps  2 laps OTB 3 laps OTB    Step ups FW 4" 4" 10 4" x10 4"x15 4"x15 4" 15 4"x15 6"x10 6"x10    Step ups lat 4" 4"x10 4" 10 4"x15 4"x15 4" 15 4"x15 6"x10 6"x10    DLS: alt march 10 15 15 15 15 15 15 15 stand 15 stand    DLS: supine alt march        10 supine 10 supine                              Ther Ex             bike        5' 5'    Seated ball squeeze 10 x5" 10x5" 10x5" 10x5" 10x5" 10x5" 10x5" 10x10" 10  x10"    LAQ 10 x5" 10x5" 10x5" 10x5" 10x5" 10x 5" 10x5" 10x10" 1 5# 10x10" 1 5#    DLS: Supine SLR AA 66H9 AA 10 indep x4 indep fatigue x7 indep fatigue x9 ind fatgiue x10 x10  x10    bridges  10x5" 10 10x5" 10x5" 10x5" 10x5" 10x10" 10  x10"    clamshells 10 10 L 10 L 10 L 10 L   10x5" OTB 10x5"  OTB    Quad sets 10x10" 10x10" 10"x10 10"x10 10"x10        Standing gastroc stretch      1' L 1' L 1' L 1' L    Seated hip IR/ER AROM   10 10 10   10 ea 10 ea    HS stretch  3x20" seated  3x20"  seated  3x20"    seated 3x20"    seated     3x20"   3x20"    QL str seated   3x20" 3x20" 3x20"        S/l L hip abduction      10 10 2x5 2x10    Prone on elbows        1'   1' 1'   1'    Standing hip ext      10 L 10 L 10 L 15 L    Standing hip abduction      10 L 10 L 10 L 15 L    Piriformis stretch           10"x10    Modalities             Cp post tx

## 2021-04-27 ENCOUNTER — OFFICE VISIT (OUTPATIENT)
Dept: PHYSICAL THERAPY | Facility: CLINIC | Age: 51
End: 2021-04-27
Payer: COMMERCIAL

## 2021-04-27 DIAGNOSIS — M16.12 PRIMARY OSTEOARTHRITIS OF LEFT HIP: ICD-10-CM

## 2021-04-27 DIAGNOSIS — Z96.642 STATUS POST TOTAL REPLACEMENT OF LEFT HIP: Primary | ICD-10-CM

## 2021-04-27 PROCEDURE — 97112 NEUROMUSCULAR REEDUCATION: CPT

## 2021-04-27 PROCEDURE — 97110 THERAPEUTIC EXERCISES: CPT

## 2021-04-27 NOTE — PROGRESS NOTES
Daily Note     Today's date: 2021  Patient name: Karla Rosenbaum  : 1970  MRN: [id number]  Referring provider: Casa Chacon MD  Dx:   Encounter Diagnosis     ICD-10-CM    1  Status post total replacement of left hip  Z96 642    2  Primary osteoarthritis of left hip  M16 12                   Subjective: Pt reports L hip feels tight prior to start of session today  Stiffness present primarily along L piriformis/lat hip  Notes he felt "pretty good" following his LV  Objective: See treatment diary below      Assessment: Tolerated treatment well  Was able to perform all exercise with no complaints of pain  Moderate soft tissue restriction present along L piriformis/lat hip that began to resolve slightly with manual STM  Most challenged with supine SLR and sidelying hip abd today  Patient would benefit from continued PT to further improve strength, increase available ROM, and maximize function  Plan: Continue per plan of care          Dx: s/p L MIREYA ( 3/3/21)  Anterior approach  EPOC: 21  CO-MORBIDITIES:  PERSONAL FACTORS: not working  Precautions: cerebral palsy    Manuals 3/11 3/23 3/30 4/6 4/8 4/13 4/15 4/20 4/22 4/27   L hip manual stretching  JK 13' JK [facility]   MFR L hip  JK JK [facility]   Progress note      ' 15' 15' 15' 15' 15' 15' 15'   Neuro Re-Ed             Mini squats  10 10 10 10 10 10 10 10 10   sidestepping  2 laps 3 laps 3 laps 3 laps 3 laps  2 laps OTB 3 laps OTB 3 laps OTB   Step ups FW 4" 4" 10 4" x10 4"x15 4"x15 4" 15 4"x15 6"x10 6"x10 6"x10   Step ups lat 4" 4"x10 4" 10 4"x15 4"x15 4" 15 4"x15 6"x10 6"x10 6"x10   DLS: alt march 10 15 15 15 15 15 15 15 stand 15 stand 15 stand   DLS: supine alt march        10 supine 10 supine 10 supine                             Ther Ex             bike        5' 5' 5'   Seated ball squeeze 10 x5" 10x5" 10x5" 10x5" 10x5" 10x5" 10x5" 10x10" 10  x10" 10  x10"   LAQ 10 x5" 10x5" 10x5" 10x5" 10x5" 10x 5" 10x5" 10x10" 1 5# 10x10" 1 5# 10x10" 1 5#   DLS: Supine SLR AA 20E5 AA 10 indep x4 indep fatigue x7 indep fatigue x9 ind fatgiue x10 x10  x10 x12   bridges  10x5" 10 10x5" 10x5" 10x5" 10x5" 10x10" 10  x10" 10  x10"   clamshells 10 10 L 10 L 10 L 10 L   10x5" OTB 10x5"  OTB 10x5"  OTB   Quad sets 10x10" 10x10" 10"x10 10"x10 10"x10        Standing gastroc stretch      1' L 1' L 1' L 1' L 1' L   Seated hip IR/ER AROM   10 10 10   10 ea 10 ea 10 ea   HS stretch  3x20" seated  3x20"  seated  3x20"    seated 3x20"    seated     3x20"   3x20" 3x20"   QL str seated   3x20" 3x20" 3x20"        S/l L hip abduction      10 10 2x5 2x10 1x10   Prone on elbows        1'   1' 1'   1'  1'   Standing hip ext      10 L 10 L 10 L 15 L 15 L   Standing hip abduction      10 L 10 L 10 L 15 L 15 L   Piriformis stretch           10"x10 10"x10   Modalities             Cp post tx

## 2021-04-29 ENCOUNTER — OFFICE VISIT (OUTPATIENT)
Dept: PHYSICAL THERAPY | Facility: CLINIC | Age: 51
End: 2021-04-29
Payer: COMMERCIAL

## 2021-04-29 DIAGNOSIS — Z96.642 STATUS POST TOTAL REPLACEMENT OF LEFT HIP: Primary | ICD-10-CM

## 2021-04-29 DIAGNOSIS — M16.12 PRIMARY OSTEOARTHRITIS OF LEFT HIP: ICD-10-CM

## 2021-04-29 PROCEDURE — 97112 NEUROMUSCULAR REEDUCATION: CPT | Performed by: PHYSICAL THERAPIST

## 2021-04-29 PROCEDURE — 97110 THERAPEUTIC EXERCISES: CPT | Performed by: PHYSICAL THERAPIST

## 2021-04-29 PROCEDURE — 97140 MANUAL THERAPY 1/> REGIONS: CPT | Performed by: PHYSICAL THERAPIST

## 2021-04-29 NOTE — PROGRESS NOTES
Daily Note     Today's date: 2021  Patient name: Aaron Dolan  : 1970  MRN: [id number]  Referring provider: Alberto Chirinos MD  Dx:   Encounter Diagnosis     ICD-10-CM    1  Status post total replacement of left hip  Z96 642    2  Primary osteoarthritis of left hip  M16 12                   Subjective: Pt reports soreness in the back of his hip as his only sx  Pt reports he lacks stamina  Objective: See treatment diary below      Assessment: Tolerated treatment well  Patient would benefit from continued PT  Pt is using a tennis ball for self MFR at home  Encouraged pt to do the bike everyday and to perform SLR on a daily basis  Issued updated HEP  Plan: Continue per plan of care        Dx: s/p L MIREYA ( 3/3/21)  Anterior approach  EPOC: 21  CO-MORBIDITIES:  PERSONAL FACTORS: not working  Precautions: cerebral palsy    Manuals 4/29    4/8 4/13 4/15 4/20 4/22 4/27   L hip manual stretching [facility]   MFR L hip [facility]   Progress note              12'    15' 15' 15' 15' 15 15   Neuro Re-Ed             Mini squats     10 10 10 10 10 10   sidestepping 3 laps OTB    3 laps 3 laps  2 laps OTB 3 laps OTB 3 laps OTB   Step ups FW 6"x10    4"x15 4" 15 4"x15 6"x10 6"x10 6"x10   Step ups lat 6"x10    4"x15 4" 15 4"x15 6"x10 6"x10 6"x10   DLS: alt march 10    15 15 15 15 stand 15 stand 15 stand   DLS: supine alt march 20       10 supine 10 supine 10 supine   Sit to stand 10                         Ther Ex             bike 5 5'       5' 5' 5'   Supine ball squeeze 10 x10"    10x5" 10x5" 10x5" 10x10" 10  x10" 10  x10"   LAQ 10 x5"    10x5" 10x 5" 10x5" 10x10" 1 5# 10x10" 1 5# 10x10" 1 5#   DLS: Supine SLR 10    x7 indep fatigue x9 ind fatgiue x10 x10  x10 x12   bridges 10x10"    10x5" 10x5" 10x5" 10x10" 10  x10" 10  x10"   clamshells 10 GTB    10 L   10x5" OTB 10x5"  OTB 10x5"  OTB   Quad sets 10x10"    10"x10        Standing gastroc stretch 1' B     1' L 1' L 1' L 1' L 1' L   Seated hip IR/ER AROM 15    10   10 ea 10 ea 10 ea   HS stretch 3x20"    3x20"    seated     3x20"   3x20" 3x20"   QL str seated     3x20"        S/l L hip abduction 10     10 10 2x5 2x10 1x10   Prone on elbows 1'       1'   1' 1'   1'  1'   Standing hip ext 2x10 B     10 L 10 L 10 L 15 L 15 L   Standing hip abduction 2x10 B     10 L 10 L 10 L 15 L 15 L   Piriformis stretch Gentle 3x20"          10"x10 10"x10   Modalities             Cp post tx

## 2021-05-02 ENCOUNTER — IMMUNIZATIONS (OUTPATIENT)
Dept: FAMILY MEDICINE CLINIC | Facility: HOSPITAL | Age: 51
End: 2021-05-02

## 2021-05-02 DIAGNOSIS — Z23 ENCOUNTER FOR IMMUNIZATION: Primary | ICD-10-CM

## 2021-05-02 PROCEDURE — 91300 SARS-COV-2 / COVID-19 MRNA VACCINE (PFIZER-BIONTECH) 30 MCG: CPT

## 2021-05-02 PROCEDURE — 0001A SARS-COV-2 / COVID-19 MRNA VACCINE (PFIZER-BIONTECH) 30 MCG: CPT

## 2021-05-04 ENCOUNTER — OFFICE VISIT (OUTPATIENT)
Dept: PHYSICAL THERAPY | Facility: CLINIC | Age: 51
End: 2021-05-04
Payer: COMMERCIAL

## 2021-05-04 DIAGNOSIS — Z96.642 STATUS POST TOTAL REPLACEMENT OF LEFT HIP: Primary | ICD-10-CM

## 2021-05-04 DIAGNOSIS — M16.12 PRIMARY OSTEOARTHRITIS OF LEFT HIP: ICD-10-CM

## 2021-05-04 PROCEDURE — 97110 THERAPEUTIC EXERCISES: CPT

## 2021-05-04 PROCEDURE — 97112 NEUROMUSCULAR REEDUCATION: CPT

## 2021-05-04 NOTE — PROGRESS NOTES
Daily Note     Today's date: 2021  Patient name: Glory Hopson  : 1970  MRN: [id number]  Referring provider: Tatyana Whiting MD  Dx:   Encounter Diagnosis     ICD-10-CM    1  Status post total replacement of left hip  Z96 642    2  Primary osteoarthritis of left hip  M16 12                   Subjective: Pt reports L hip feeling good today, but stiff  Notes he is most stiff after seated for extended periods of time, and starts to loosen up after the first 5-6 steps  Objective: See treatment diary below      Assessment: Tolerated treatment well  Continued with program as outlined below  Is making good progress toward long term goals established by evaluating PT  Frequent compensation of hip flexors during sidelying hip abd, requiring verbal/tactile cues to correct form  Patient would benefit from continued PT to further improve strength, increase available ROM, and maximize overall function  Plan: Continue per plan of care        Dx: s/p L MIREYA ( 3/3/21)  Anterior approach  EPOC: 21  CO-MORBIDITIES:  PERSONAL FACTORS: not working  Precautions: cerebral palsy    Manuals 4/29 5/4   4/8 4/13 4/15 4/20 4/22 4/27   L hip manual stretching th AF[employer]    th  th  [facility]   MFR L hip th [facility]   JM ITB th [facility]   Progress note              12' 13'   15' 15' 15' 15 15 15   Neuro Re-Ed             Mini squats     10 10 10 10 10 10   sidestepping 3 laps OTB At mirror  4 laps OTB   3 laps 3 laps  2 laps OTB 3 laps OTB 3 laps OTB   Step ups FW 6"x10 6"x10   4"x15 4" 15 4"x15 6"x10 6"x10 6"x10   Step ups lat 6"x10 6"x10   4"x15 4" 15 4"x15 6"x10 6"x10 6"x10   DLS: alt march 10 37R   15 15 15 15 stand 15 stand 15 stand   DLS: supine alt march 20 20x ea      10 supine 10 supine 10 supine   Sit to stand 10 10x                        Ther Ex             bike 5 5' 5'      5' 5' 5'   Supine ball squeeze 10 x10" 10x10"   10x5" 10x5" 10x5" 10x10" 10  x10" 10  x10"   LAQ 10 x5" 10x10" 1 5#   10x5" 10x 5" 10x5" 10x10" 1 5# 10x10" 1 5# 10x10" 1 5#   DLS: Supine SLR 10 K66   x7 indep fatigue x9 ind fatgiue x10 x10  x10 x12   bridges 10x10" 10x10"   10x5" 10x5" 10x5" 10x10" 10  x10" 10  x10"   clamshells 10 GTB GTB  5"x10   10 L   10x5" OTB 10x5"  OTB 10x5"  OTB   Quad sets 10x10" 10x10"   10"x10        Standing gastroc stretch 1' B 1' B    1' L 1' L 1' L 1' L 1' L   Seated hip IR/ER AROM 15 15x   10   10 ea 10 ea 10 ea   HS stretch 3x20" 3x20"   3x20"    seated     3x20"   3x20" 3x20"   QL str seated     3x20"        S/l L hip abduction 10 x10    10 10 2x5 2x10 1x10   Prone on elbows 1' 1'      1'   1' 1'   1'  1'   Standing hip ext 2x10 B 2x10 B    10 L 10 L 10 L 15 L 15 L   Standing hip abduction 2x10 B 2x10 B    10 L 10 L 10 L 15 L 15 L   Piriformis stretch Gentle 3x20" Gentle 3x20"         10"x10 10"x10   Modalities             Cp post tx

## 2021-05-06 ENCOUNTER — OFFICE VISIT (OUTPATIENT)
Dept: PHYSICAL THERAPY | Facility: CLINIC | Age: 51
End: 2021-05-06
Payer: COMMERCIAL

## 2021-05-06 DIAGNOSIS — Z96.642 STATUS POST TOTAL REPLACEMENT OF LEFT HIP: Primary | ICD-10-CM

## 2021-05-06 DIAGNOSIS — M16.12 PRIMARY OSTEOARTHRITIS OF LEFT HIP: ICD-10-CM

## 2021-05-06 PROCEDURE — 97110 THERAPEUTIC EXERCISES: CPT

## 2021-05-06 PROCEDURE — 97112 NEUROMUSCULAR REEDUCATION: CPT

## 2021-05-06 PROCEDURE — 97140 MANUAL THERAPY 1/> REGIONS: CPT

## 2021-05-06 NOTE — PROGRESS NOTES
Daily Note     Today's date: 2021  Patient name: Sabrina Kraft  : 1970  MRN: [id number]  Referring provider: Avril Tompkins MD  Dx:   Encounter Diagnosis     ICD-10-CM    1  Status post total replacement of left hip  Z96 642    2  Primary osteoarthritis of left hip  M16 12                   Subjective: Pt notes feeling mostly stiff this AM   Reports he felt really good after his LV, but had trouble sleeping that night  This resulted in having slight increase of both pain and stiffness yesterday  Objective: See treatment diary below      Assessment: Tolerated treatment well  Progressed program with increased reps as outlined below, with good tolerance  Slightly more restricted with PROM of L hip today, compared to previous visits, but did seem to loosen up slightly with repeated movement throughout each plane  Most challenged with TB resisted clamshell today  Patient demonstrated fatigue post treatment and would benefit from continued PT  Plan: Continue per plan of care        Dx: s/p L MIREYA ( 3/3/21)  Anterior approach  EPOC: 21  CO-MORBIDITIES:  PERSONAL FACTORS: not working  Precautions: cerebral palsy    Manuals 4/29 5/4 5/6  4/8 4/13 4/15 4/20 4/22 4/27   L hip manual stretching th [employer]   th JM th [employer]M [employer]   MFR L hip th [employer]  JM ITB th  th JM [employer]   Progress note              12' 13' 12'  15' 15' 15' 15' 15' 15'   Neuro Re-Ed             Mini squats     10 10 10 10 10 10   sidestepping 3 laps OTB At mirror  4 laps OTB At mirror  4 laps OTB  3 laps 3 laps  2 laps OTB 3 laps OTB 3 laps OTB   Step ups FW 6"x10 6"x10 6"x12  4"x15 4" 15 4"x15 6"x10 6"x10 6"x10   Step ups lat 6"x10 6"x10 6"x12  4"x15 4" 15 4"x15 6"x10 6"x10 6"x10   DLS: alt march 10 55M   15 15 15 15 stand 15 stand 15 stand   DLS: supine alt  20x ea 20x ea     10 supine 10 supine 10 supine   Sit to stand 10 10x 15x                       Ther Ex             bike 5 5' 5' 5'     5' 5' 5'   Supine ball squeeze 10 x10" 10x10" 10x10"  10x5" 10x5" 10x5" 10x10" 10  x10" 10  x10"   LAQ 10 x5" 10x10" 1 5# 10x10" 1 5#  10x5" 10x 5" 10x5" 10x10" 1 5# 10x10" 1 5# 10x10" 1 5#   DLS: Supine SLR 10 X21 x10  x7 indep fatigue x9 ind fatgiue x10 x10  x10 x12   bridges 10x10" 10x10" 10x10"  10x5" 10x5" 10x5" 10x10" 10  x10" 10  x10"   clamshells 10 GTB GTB  5"x10 GTB  5"x10  10 L   10x5" OTB 10x5"  OTB 10x5"  OTB   Quad sets 10x10" 10x10" 10x10"  10"x10        Standing gastroc stretch 1' B 1' B 1' B   1' L 1' L 1' L 1' L 1' L   Seated hip IR/ER AROM 15 15x 15x  10   10 ea 10 ea 10 ea   HS stretch 3x20" 3x20" 3x20"  3x20"    seated     3x20"   3x20" 3x20"   QL str seated     3x20"        S/l L hip abduction 10 x10 nv   10 10 2x5 2x10 1x10   Prone on elbows 1' 1' 1'     1'   1' 1'   1'  1'   Standing hip ext 2x10 B 2x10 B 2x10 B   10 L 10 L 10 L 15 L 15 L   Standing hip abduction 2x10 B 2x10 B 2x10 B   10 L 10 L 10 L 15 L 15 L   Piriformis stretch Gentle 3x20" Gentle 3x20" Gentle 3x20"        10"x10 10"x10   Modalities             Cp post tx

## 2021-05-11 ENCOUNTER — APPOINTMENT (OUTPATIENT)
Dept: PHYSICAL THERAPY | Facility: CLINIC | Age: 51
End: 2021-05-11
Payer: COMMERCIAL

## 2021-05-13 ENCOUNTER — OFFICE VISIT (OUTPATIENT)
Dept: PHYSICAL THERAPY | Facility: CLINIC | Age: 51
End: 2021-05-13
Payer: COMMERCIAL

## 2021-05-13 DIAGNOSIS — M16.12 PRIMARY OSTEOARTHRITIS OF LEFT HIP: ICD-10-CM

## 2021-05-13 DIAGNOSIS — Z96.642 STATUS POST TOTAL REPLACEMENT OF LEFT HIP: Primary | ICD-10-CM

## 2021-05-13 PROCEDURE — 97110 THERAPEUTIC EXERCISES: CPT

## 2021-05-13 PROCEDURE — 97112 NEUROMUSCULAR REEDUCATION: CPT

## 2021-05-13 NOTE — PROGRESS NOTES
Daily Note     Today's date: 2021  Patient name: Antoinette Long  : 1970  MRN: [id number]  Referring provider: Russel Castaneda MD  Dx:   Encounter Diagnosis     ICD-10-CM    1  Status post total replacement of left hip  Z96 642    2  Primary osteoarthritis of left hip  M16 12        Start Time: 1100  Stop Time: 1145  Total time in clinic (min): 45 minutes    Subjective: Patient continues to have difficulty getting comfortable while sleeping at night  Therefore, limiting the hours of rest at night  Has been napping during the day to make up on sleep  States, "I feel like the leg press hit a lot of muscles I needed to work"  Objective: See treatment diary below    Assessment: Tolerated treatment well  Patient quickly fatigued to the current PT POC  Good response with the addition of leg press this visit  Progress as able  Patient demonstrated fatigue post treatment and would benefit from continued PT  Plan: Continue per plan of care        Dx: s/p L MIREYA ( 3/3/21)  Anterior approach  EPOC: 21  CO-MORBIDITIES:  PERSONAL FACTORS: not working  Precautions: cerebral palsy    Manuals    L hip manual stretching th [employer] JM    th [employer]   MFR L hip th [employer]     th [employer]   Progress note              ' 12' 10'    15' 15' 15'   Neuro Re-Ed             Mini squats        10 10 10   sidestepping 3 laps OTB At mirror  4 laps OTB At mirror  4 laps OTB nv    2 laps OTB 3 laps OTB 3 laps OTB   Step ups FW 6"x10 6"x10 6"x12 6" x20 8"   6"x10 6"x10 6"x10   Step ups lat 6"x10 6"x10 6"x12 6" x15    6"x10 6"x10 6"x10   DLS: alt march 10 99P      15 stand 15 stand 15 stand   DLS: supine alt march 20 20x ea 20x ea 20x ea    10 supine 10 supine 10 supine   Sit to stand 10 10x 15x 15x                      Ther Ex             bike 5 5' 5' 5' 7'    5' 5' 5'   Supine ball squeeze 10 x10" 10x10" 10x10" 10x10"    10x10" 10  x10" 10  x10"   LAQ 10 x5" 10x10" 1 5# 10x10" 1 5# 10x10" 1 5#    10x10" 1 5# 10x10" 1 5# 10x10" 1 5#   DLS: Supine SLR 10 J38 x10 x10    x10  x10 x12   bridges 10x10" 10x10" 10x10" 10x10"    10x10" 10  x10" 10  x10"   clamshells 10 GTB GTB  5"x10 GTB  5"x10 GTB  5"x10    10x5" OTB 10x5"  OTB 10x5"  OTB   Quad sets 10x10" 10x10" 10x10" 10x10"         Standing gastroc stretch 1' B 1' B 1' B 1' B    1' L 1' L 1' L   Seated hip IR/ER AROM 15 15x 15x 15x    10 ea 10 ea 10 ea   HS stretch 3x20" 3x20" 3x20"   3x20"      3x20"   3x20" 3x20"   QL str seated             S/l L hip abduction 10 x10 nv nv    2x5 2x10 1x10   Prone on elbows 1' 1' 1'   nv    1'   1'  1'   Standing hip ext 2x10 B 2x10 B 2x10 B 2x10 B    10 L 15 L 15 L   Standing hip abduction 2x10 B 2x10 B 2x10 B 2x10 B    10 L 15 L 15 L   Piriformis stretch Gentle 3x20" Gentle 3x20" Gentle 3x20" Gentle 3x20"       10"x10 10"x10   Leg Press      55#       2x10                      Modalities             Cp post tx

## 2021-05-18 ENCOUNTER — EVALUATION (OUTPATIENT)
Dept: PHYSICAL THERAPY | Facility: CLINIC | Age: 51
End: 2021-05-18
Payer: COMMERCIAL

## 2021-05-18 DIAGNOSIS — Z96.642 STATUS POST TOTAL REPLACEMENT OF LEFT HIP: Primary | ICD-10-CM

## 2021-05-18 DIAGNOSIS — M16.12 PRIMARY OSTEOARTHRITIS OF LEFT HIP: ICD-10-CM

## 2021-05-18 PROCEDURE — 97112 NEUROMUSCULAR REEDUCATION: CPT | Performed by: PHYSICAL THERAPIST

## 2021-05-18 PROCEDURE — 97140 MANUAL THERAPY 1/> REGIONS: CPT | Performed by: PHYSICAL THERAPIST

## 2021-05-18 PROCEDURE — 97110 THERAPEUTIC EXERCISES: CPT | Performed by: PHYSICAL THERAPIST

## 2021-05-18 NOTE — PROGRESS NOTES
PT Re-Evaluation     Today's date: 2021  Patient name: An Patterson  : 1970  MRN: [id number]  Referring provider: Murvin Dancer, MD  Dx:   Encounter Diagnosis     ICD-10-CM    1  Status post total replacement of left hip  Z96 642    2  Primary osteoarthritis of left hip  M16 12                   Assessment  Assessment details: Since starting skilled PT, pain levels are unchanged from last RA, L hip strength and ROM are improving with slow functional progress  Recommend pt continue skilled PT to focus on decreasing pain and improving functional activities  Therapist discussed the importance of performing his HEP on a regular basis  This may be a factor along with history of CP that is contributing towards decreased functional change  Impairments: abnormal gait, abnormal muscle tone, abnormal or restricted ROM, activity intolerance, impaired balance, impaired physical strength and pain with function  Barriers to therapy: 1  History of cerebral palsy  Understanding of Dx/Px/POC: good   Prognosis: good    Goals  STG's ( 3-4 weeks)  1  Pt will be independent in HEP-met  2  Pt will have decreased hip pain rated 2-3/10 with activity-not met  LTG's ( 6- 8 weeks)  1  Improve FOTO score by 8-10 points- met  2  Pt will be able to walk community distances without an assistive device-not met  3  Pt will be able to go up and down the steps reciprocally-met  4   Improve L hip strength by 1/2 grade-met    Plan  Patient would benefit from: skilled physical therapy  Planned modality interventions: cryotherapy  Planned therapy interventions: manual therapy, neuromuscular re-education, balance, therapeutic activities, stretching, strengthening, therapeutic exercise, home exercise program, functional ROM exercises and flexibility  Frequency: 2x week  Duration in weeks: 6  Plan of Care beginning date: 2021  Plan of Care expiration date: 2021  Treatment plan discussed with: patient        Subjective Evaluation    History of Present Illness  Date of surgery: 3/3/2021  Mechanism of injury: surgery  Mechanism of injury: I E: Pt underwent L MIREYA anterior approach on 3/3/21  Pt also had a leg lengthening procedure 2* cerebral palsy diagnosis  Pt is able to walk inside his home with B axillary crutches  Pt is going the steps one at a time  Pt is able to stand for about 20-30 minutes with crutches  Pt's spouse is helping him put on his shoes and socks  21: Pt walks with a SPC or without an assistive device  Pt has not tried to walk community distances  Pt continues to have difficulty putting on shoes  Pt has purchased a long shoe horn  Pt goes up the steps reciprocally with a low set of stairs  Pt continues to have pain with sleeping activities  21: Pt is no longer using the [facility] with walking activities  Pt has some pain when going up and down the steps  Pt continues to have pain with sleeping and pt finds it hard to get comfortable at night  Pt is able to work for 4-5 hours at a time, standing at a table  Pt notes his hip stiffens up quickly  Pt is able to put on shoes on with greater ease  Work: not currently working; needs to be able to climb [employer]: traveling, beach, hiking, biking  Gait: pt ambulates with B axillary crutches with decreased weight bearing L LE    Pain  At best pain ratin  At worst pain ratin  Location: L hip  Quality: dull ache  Relieving factors: ice and medications    Social Support  Steps to enter house: yes  8  Stairs in house: no   Lives in: [facility]  Lives with: spouse    Employment status: not working  Patient Goals  Patient goals for therapy: return to work and return to sport/leisure activities  Patient goal: bike riding, hiking, walking, working        Objective     Neurological Testing     Sensation     Hip   Left Hip   Intact: light touch    Right Hip   Intact: light touch    Reflexes   Left   Patellar (L4): normal (2+)    Right   Patellar (L4): normal (2+)  Achilles (S1): normal (2+)    Comments   Left Achilles (S1): abrnormal; L ankle inverts and plantar flexes    Active Range of Motion   Left Hip   Flexion: 100 degrees with pain  Extension: 5 degrees   Abduction: 15 degrees with pain  External rotation (90/90): 22 degrees with pain  Internal rotation (90/90): 30 degrees with pain    Right Hip   Normal active range of motion    Additional Active Range of Motion Details  (+) L anterior and lateral hip TTP with mild warmth to touch  (+) L LE muscular atrophy upon visual inspection 2* CP  Pt has increased pulling sx with supine positioning and is unable to lay with hip in extended position    Strength/Myotome Testing     Left Hip   Planes of Motion   Flexion: 4+  Left hip extension strength: NT   Abduction: 4+  Internal rotation: 4    Isolated Muscles   Gluteus violet: 4    Right Hip   Normal muscle strength    Left Knee   Flexion: 4+  Extension: 4+    Additional Strength Details  L ankle df MMT: 1/5 with minimal mvt         Dx: s/p L MIREYA ( 3/3/21)  Anterior approach  EPOC: 5/25/21  CO-MORBIDITIES:  PERSONAL FACTORS:   Precautions: cerebral palsy    Manuals 4/29 5/4 5/6 5/13 5/18 4/20 4/22 4/27   L hip manual stretching th [employer]  th   th [employer]   MFR L hip th [employer]  th   th [employer]   Progress note     th         12' 13' 12' 10' 15'   15' 15' 15'   Neuro Re-Ed             Mini squats     10   10 10 10   sidestepping 3 laps OTB At mirror  4 laps OTB At mirror  4 laps OTB nv GTB x2 laps   2 laps OTB 3 laps OTB 3 laps OTB   Step ups FW 6"x10 6"x10 6"x12 6" x20 8"x10   6"x10 6"x10 6"x10   Step ups lat 6"x10 6"x10 6"x12 6" x15 8"x10   6"x10 6"x10 6"x10   DLS: alt march 10 94Q      15 stand 15 stand 15 stand   DLS: supine alt march 20 20x ea 20x ea 20x ea    10 supine 10 supine 10 supine   Sit to stand 10 10x 15x 15x 15                     Ther Ex             bike 5 5' 5' 5' 7' 7'   5' 5' 5'   Supine ball squeeze 10 x10" 10x10" 10x10" 10x10"    10x10" 10  x10" 10  x10"   LAQ 10 x5" 10x10" 1 5# 10x10" 1 5# 10x10" 1 5#    10x10" 1 5# 10x10" 1 5# 10x10" 1 5#   DLS: Supine SLR 10 A59 x10 x10 15   x10  x10 x12   bridges 10x10" 10x10" 10x10" 10x10" 10x10"   10x10" 10  x10" 10  x10"   clamshells 10 GTB GTB  5"x10 GTB  5"x10 GTB  5"x10    10x5" OTB 10x5"  OTB 10x5"  OTB   Quad sets 10x10" 10x10" 10x10" 10x10"         Standing gastroc stretch 1' B 1' B 1' B 1' B 1' B   1' L 1' L 1' L   Seated hip IR/ER AROM 15 15x 15x 15x    10 ea 10 ea 10 ea   HS stretch 3x20" 3x20" 3x20"   3x20" 3x20"     3x20"   3x20" 3x20"   QL str seated             S/l L hip abduction 10 x10 nv nv 2x10   2x5 2x10 1x10   Prone on elbows 1' 1' 1'   nv    1'   1'  1'   Standing hip ext 2x10 B 2x10 B 2x10 B 2x10 B 15 1 5# B   10 L 15 L 15 L   Standing hip abduction 2x10 B 2x10 B 2x10 B 2x10 B 15 1 5# B   10 L 15 L 15 L   Piriformis stretch Gentle 3x20" Gentle 3x20" Gentle 3x20" Gentle 3x20"       10"x10 10"x10   Leg Press      55#       2x10 75#2x10                     Modalities             Cp post tx

## 2021-05-20 ENCOUNTER — EVALUATION (OUTPATIENT)
Dept: PHYSICAL THERAPY | Facility: CLINIC | Age: 51
End: 2021-05-20
Payer: COMMERCIAL

## 2021-05-20 DIAGNOSIS — Z96.642 STATUS POST TOTAL REPLACEMENT OF LEFT HIP: Primary | ICD-10-CM

## 2021-05-20 DIAGNOSIS — M16.12 PRIMARY OSTEOARTHRITIS OF LEFT HIP: ICD-10-CM

## 2021-05-20 PROCEDURE — 97140 MANUAL THERAPY 1/> REGIONS: CPT | Performed by: PHYSICAL THERAPIST

## 2021-05-20 PROCEDURE — 97112 NEUROMUSCULAR REEDUCATION: CPT | Performed by: PHYSICAL THERAPIST

## 2021-05-20 PROCEDURE — 97110 THERAPEUTIC EXERCISES: CPT | Performed by: PHYSICAL THERAPIST

## 2021-05-20 NOTE — PROGRESS NOTES
Daily Note   Addendum 21: D/c pt from skilled PT at this time  Pt has not returned to therapy in about 2 months  Today's date: 2021  Patient name: Charley Sifuentes  : 1970  MRN: [id number]  Referring provider: Neville De La Vega MD  Dx:   Encounter Diagnosis     ICD-10-CM    1  Status post total replacement of left hip  Z96 642    2  Primary osteoarthritis of left hip  M16 12                   Subjective: Pt reports stiffness in his hip today  Pt reports he is lacking stamina      Objective: See treatment diary below      Assessment: Tolerated treatment well  Patient demonstrated fatigue post treatment  Encouraged pt to perform exercises regularly at home to improve stamina and strength in his hip      Plan: Continue per plan of care        Dx: s/p L MRIEYA ( 3/3/21) anterior approach  EPOC: 21  CO-MORBIDITIES:  PERSONAL FACTORS: not working  Precautions: cerebral palsy    Manuals    L hip manual stretching th [employer] JM th   th [employer]   MFR L hip th [employer]  th   th  AFB   Progress note              12' 13' 12' 10' 12'   15' 15' 15'   Neuro Re-Ed             Mini squats     x20 airex   10 10 10   sidestepping 3 laps OTB At mirror  4 laps OTB At mirror  4 laps OTB nv 2 laps GTB   2 laps OTB 3 laps OTB 3 laps OTB   Step ups FW 6"x10 6"x10 6"x12 6" x20 8"x10   6"x10 6"x10 6"x10   Step ups lat 6"x10 6"x10 6"x12 6" x15 8"x10   6"x10 6"x10 6"x10   DLS: alt march 10 38O      15 stand 15 stand 15 stand   DLS: supine alt  20x ea 20x ea 20x ea    10 supine 10 supine 10 supine   Sit to stand 10 10x 15x 15x                      Ther Ex             bike 5 5' 5' 5' 7' 7'   5' 5' 5'   Supine ball squeeze 10 x10" 10x10" 10x10" 10x10"    10x10" 10  x10" 10  x10"   LAQ 10 x5" 10x10" 1 5# 10x10" 1 5# 10x10" 1 5# 10x10" 2 5#   10x10" 1 5# 10x10" 1 5# 10x10" 1 5#   DLS: Supine SLR 10 Z54 x10 x10 x15   x10  x10 x12   bridges 10x10" 10x10" 10x10" 10x10" 10x10"   10x10" 10  x10" 10  x10"   clamshells 10 GTB GTB  5"x10 GTB  5"x10 GTB  5"x10 GTB 2x10   10x5" OTB 10x5"  OTB 10x5"  OTB   Quad sets 10x10" 10x10" 10x10" 10x10"         Standing gastroc stretch 1' B 1' B 1' B 1' B 1' B   1' L 1' L 1' L   Seated hip IR/ER AROM 15 15x 15x 15x 15 2 5#   10 ea 10 ea 10 ea   HS stretch 3x20" 3x20" 3x20"   3x20" 3x20"     3x20"   3x20" 3x20"   QL str seated             S/l L hip abduction 10 x10 nv nv 10   2x5 2x10 1x10   Prone on elbows 1' 1' 1'   nv    1'   1'  1'   Standing hip ext 2x10 B 2x10 B 2x10 B 2x10 B 2x10 B 1 5#   10 L 15 L 15 L   Standing hip abduction 2x10 B 2x10 B 2x10 B 2x10 B 2x10 B 1 5#   10 L 15 L 15 L   Piriformis stretch Gentle 3x20" Gentle 3x20" Gentle 3x20" Gentle 3x20"       10"x10 10"x10   Leg Press      55#       2x10 75#2x15                     Modalities             Cp post tx

## 2021-05-25 ENCOUNTER — APPOINTMENT (OUTPATIENT)
Dept: PHYSICAL THERAPY | Facility: CLINIC | Age: 51
End: 2021-05-25
Payer: COMMERCIAL

## 2021-05-27 ENCOUNTER — APPOINTMENT (OUTPATIENT)
Dept: PHYSICAL THERAPY | Facility: CLINIC | Age: 51
End: 2021-05-27
Payer: COMMERCIAL

## 2021-05-28 ENCOUNTER — IMMUNIZATIONS (OUTPATIENT)
Dept: FAMILY MEDICINE CLINIC | Facility: HOSPITAL | Age: 51
End: 2021-05-28

## 2021-05-28 DIAGNOSIS — Z23 ENCOUNTER FOR IMMUNIZATION: Primary | ICD-10-CM

## 2021-05-28 PROCEDURE — 91300 SARS-COV-2 / COVID-19 MRNA VACCINE (PFIZER-BIONTECH) 30 MCG: CPT

## 2021-05-28 PROCEDURE — 0002A SARS-COV-2 / COVID-19 MRNA VACCINE (PFIZER-BIONTECH) 30 MCG: CPT

## 2021-06-01 DIAGNOSIS — F41.9 ANXIETY: ICD-10-CM

## 2021-06-02 RX ORDER — BUPROPION HYDROCHLORIDE 150 MG/1
150 TABLET, EXTENDED RELEASE ORAL 2 TIMES DAILY
Qty: 60 TABLET | Refills: 5 | Status: SHIPPED | OUTPATIENT
Start: 2021-06-02 | End: 2021-12-07

## 2021-07-08 ENCOUNTER — TELEPHONE (OUTPATIENT)
Dept: FAMILY MEDICINE CLINIC | Facility: CLINIC | Age: 51
End: 2021-07-08

## 2021-07-09 ENCOUNTER — OFFICE VISIT (OUTPATIENT)
Dept: OBGYN CLINIC | Facility: CLINIC | Age: 51
End: 2021-07-09
Payer: COMMERCIAL

## 2021-07-09 ENCOUNTER — APPOINTMENT (OUTPATIENT)
Dept: RADIOLOGY | Facility: CLINIC | Age: 51
End: 2021-07-09
Payer: COMMERCIAL

## 2021-07-09 VITALS
SYSTOLIC BLOOD PRESSURE: 125 MMHG | BODY MASS INDEX: 28.88 KG/M2 | DIASTOLIC BLOOD PRESSURE: 70 MMHG | WEIGHT: 163 LBS | HEIGHT: 63 IN

## 2021-07-09 DIAGNOSIS — Z96.642 AFTERCARE FOLLOWING LEFT HIP JOINT REPLACEMENT SURGERY: Primary | ICD-10-CM

## 2021-07-09 DIAGNOSIS — Z96.642 AFTERCARE FOLLOWING LEFT HIP JOINT REPLACEMENT SURGERY: ICD-10-CM

## 2021-07-09 DIAGNOSIS — Z47.1 AFTERCARE FOLLOWING LEFT HIP JOINT REPLACEMENT SURGERY: ICD-10-CM

## 2021-07-09 DIAGNOSIS — Z47.1 AFTERCARE FOLLOWING LEFT HIP JOINT REPLACEMENT SURGERY: Primary | ICD-10-CM

## 2021-07-09 PROCEDURE — 3008F BODY MASS INDEX DOCD: CPT | Performed by: ORTHOPAEDIC SURGERY

## 2021-07-09 PROCEDURE — 99213 OFFICE O/P EST LOW 20 MIN: CPT | Performed by: ORTHOPAEDIC SURGERY

## 2021-07-09 PROCEDURE — 73502 X-RAY EXAM HIP UNI 2-3 VIEWS: CPT

## 2021-07-09 NOTE — PROGRESS NOTES
PROGRESS NOTE:  Post-operative Visit Total Hip Replacement  Date of Surgery: 3/3/21    SUBJECTIVE:  Patient returns for follow-up, status post L DAA MIREYA     The pain is mild and moderate  Medication for pain is tylenol, NSAID and narcotic   - narcotic prn (mostly after therapy)    Gait: antalgic  Assistive device:  cane  Return to work: No    Bobo Owusu presents to office  He is doing very well  No assistive device  He is riding his bike 18 miles per week  He is back to work  Occaisonal stiffness when sits too long  OBJECTIVE:  Hip wound well healed  Neurovascular status is normal    Limp: mild and moderate  Trace hip flexor weakness but grossly intact  Extension: 0  Flexion: 120  Internal rotation: 10  External rotation: 10  Leg lengths: equal      Sensation Intact to Light Touch in Sural, Saphenous, Tibial, Superficial Peroneal, and Deep Peroneal Nerve Distribution  Motor function 5 out of 5 strength in Gastrocnemius, Soleus, Extensor Hallucis Longus, and Flexor Hallucis Longus Muscles  Extremity Warm and Well Perfused  Brisk Capillary Refill in Toes  RADIOGRAPHS:  Radiographs demonstrate MIREYA implants in place with no obvious complication  Appear well fixed  ASSESSMENT & PLAN:  - mild-moderate acitivity as tolerated  - no hip precautions  - okay to submerge incision  - followup 1 year anniversary with Dr Garrick Morrell    Discussed with patient that should any issues/questions/concerns arise they should phone the office  Amoxicillin script called in      [address] [place] [address]   Rakan Cabral MD  Adult Reconstruction  Department of [place] [id number]  10:55 AM

## 2021-08-02 DIAGNOSIS — F41.9 ANXIETY: ICD-10-CM

## 2021-08-03 RX ORDER — LORAZEPAM 1 MG/1
TABLET ORAL
Qty: 90 TABLET | Refills: 5 | Status: SHIPPED | OUTPATIENT
Start: 2021-08-03 | End: 2022-02-08

## 2021-08-03 RX ORDER — LORAZEPAM 1 MG/1
1 TABLET ORAL EVERY 8 HOURS PRN
Qty: 90 TABLET | Refills: 5 | OUTPATIENT
Start: 2021-08-03

## 2021-09-09 NOTE — PROGRESS NOTES
Daily Note     Today's date: 2021  Patient name: Grace Okeefe  : 1970  MRN: [id number]  Referring provider: Radha Jett MD  Dx:   Encounter Diagnosis     ICD-10-CM    1  Primary osteoarthritis of left hip  M16 12                   Subjective: Pt reports very little change in pain sx in L hip  Reports compliance w/ HEP  Pt states weather and working makes the L hip feel worse  Pt notes the exercises and stretches in therapy and at home are not helping hip progress or eliminate any pain  Objective: See treatment diary below      Assessment: Tolerated treatment well  Pt progressed well to increase wt w/ LAQ  Patient exhibited good technique with therapeutic exercises  Pt gets temporary relief from long leg distraction  Plan: Continue per plan of care  Dx: L hip OA  EPOC: 3/16/21  CO-MORBIDITIES: CP, L LE shorter  PERSONAL FACTORS:   Precautions: limit weight bearing, L gastroc atrophy, L ankle stiffness;  Hx LBP    Manuals          L hip PROM NK/JK NK/JK th NK         L hip flexor MFR   th NK         L hip inferior glides   th np         Long leg distraction 10' 8' 10' NK 15'         Neuro Re-Ed                                                                                                        Ther Ex             bike 5' 5' 6' L2 6'          LAQ 10 10 #1 #2 20         SLR 10 10 15 15         S/l hip abduction 10 10 15 15         hooklying ball squeeze 10"x10 10"x10 10x10" 10x10"         Prone quad stretch   3x20" 3x20"         bridges 10x10" 10"x10 10x10" pball under legs 10x10" pball         Hip flexor stretch 3X20" 3x20" 3x20" 3x20"         Quad sets w/ towel roll 10x10" 10x10" 10x10" 10x10"         Gentle SKTC w/ towel 3X20" 3x20" 3x20" 3x20"         Seated hip IR/ ER AROM 5 5 10 B 10 B         HS stretch   3x20" 3x20"         Gait Training             [address] training 1 lap                         Modalities             Cp post tx- prn np  np np Hospitalist Progress Note    NAME: Zbigniew Gilman   :  1960   MRN:  [id number]   Room Number:  615/53  @ [facility] Summary: 64 y.o. female whom presented on 2021 with      Assessment / Plan:    #Acute hypoxic respiratory failure due to COVID-19 solved  #COVID-19 pneumonia severe  #Severe sepsis  -Patient was saturating 88 to 90% on room air  -On admission temperature 102, heart rate above 100 respiratory rate 33  -Chest x-ray reviewed independently consistent with severe COVID-19 pneumonia  -COVID-19 PCR positive, CRP 1.57 > 1.42 , procalcitonin 0.54, lactic acid within normal limit  troponin 0.05, ferritin 28 687  PCT 0.5> 0.25 > 0.14    -Vitamin C and zinc  -Dexamethasone 6 mg for 10 days  -Voluntary prone positioning  -Lovenox per protocol  -Trend symmetry markers  -Ceftriaxone and azithromycin for community-acquired pneumonia -Antitussive  -Antipyretic  -Hold off on remdesivir given advanced age CKD  -Supplemental oxygen to keep above SPO2 94  -Patient could not tolerate weaning off of oxygen and 6-minute walk test today.  -Patient will require supplemental oxygen for home      #JADE on CKD 4 resolved   -Patient follows [facility] last creatinine in in the [facility] was 3.94  -On admission creatinine 5.03 down to 3.09  S/p 1 L NS   -Monitor creatinine daily  -Hold losartan and Diamox  -Ins and outs  -Renal dose medication  -Avoid nephrotoxic medication      #Hypertension  -Takes amlodipine, metoprolol losartan and Diamox at home  -Resume amlodipine as her blood pressure allows    #History of gout on allopurinol at home    #History history of AAA follows with vascular surgery as outpatient        Code status: Full  Prophylaxis: Lovenox  Recommended Disposition: Home w/Family     Subjective:     Chief Complaint / Reason for Physician Visit  Shortness of breath,   discussed with RN events overnight.      Review of Systems:  No fevers, chills, appetite change, cough, sputum production, shortness of breath, dyspnea on exertion, nausea, vomitting, diarrhea, constipation, chest pain, leg edema, abdominal pain, joint pain, rash, itching. Tolerating PT/OT. Tolerating diet. Objective:     VITALS:   Last 24hrs VS reviewed since prior progress note. Most recent are:  Patient Vitals for the past 24 hrs:   Temp Pulse Resp BP SpO2   09/09/21 0837 99.7 °F (37.6 °C) (!) 108 16 133/69 92 %   09/08/21 2124 98.4 °F (36.9 °C) (!) 114 17 134/76 93 %     No intake or output data in the 24 hours ending 09/09/21 1503     PHYSICAL EXAM:  General: WD, WN. Alert, cooperative, no acute distress    EENT:  EOMI. Anicteric sclerae. MMM  Resp:  CTA bilaterally, no wheezing or rales. No accessory muscle use  CV:  Regular  rhythm,  normal S1/S2, no murmurs rubs gallops, No edema  GI:  Soft, Non distended, Non tender. +Bowel sounds  Neurologic:  Alert and oriented X 3, normal speech,   Psych:   Good insight. Not anxious nor agitated  Skin:  No rashes. No jaundice    Reviewed most current lab test results and cultures  YES  Reviewed most current radiology test results   YES  Review and summation of old records today    NO  Reviewed patient's current orders and MAR    YES  PMH/ reviewed - no change compared to H&P  ________________________________________________________________________  Care Plan discussed with:    Comments   Patient x    Family      RN x    Care Manager x    Consultant                       x Multidiciplinary team rounds were held today with , nursing, pharmacist and clinical coordinator. Patient's plan of care was discussed; medications were reviewed and discharge planning was addressed.      ________________________________________________________________________  Total NON critical care TIME: 35  Minutes    Total CRITICAL CARE TIME Spent:   Minutes non procedure based      Comments   >50% of visit spent in counseling and coordination of care x    ________________________________________________________________________  Baron Jose Juan MD     Procedures: see electronic medical records for all procedures/Xrays and details which were not copied into this note but were reviewed prior to creation of Plan. LABS:  I reviewed today's most current labs and imaging studies.   Pertinent labs include:  Recent Labs     09/08/21  0330   WBC 13.8*   HGB 8.8*   HCT 28.7*        Recent Labs     09/09/21  0827 09/08/21  0330 09/07/21  0214   * 148* 146*   K 4.1 4.1 4.3   * 113* 110*   CO2 19* 18* 21   GLU 97 156* 140*   BUN 67* 75* 83*   CREA 2.90* 3.11* 3.09*   CA 9.9 9.0 9.3       Signed: Baron Jose Juan MD

## 2021-12-06 DIAGNOSIS — F41.9 ANXIETY: ICD-10-CM

## 2021-12-07 RX ORDER — BUPROPION HYDROCHLORIDE 150 MG/1
TABLET, EXTENDED RELEASE ORAL
Qty: 60 TABLET | Refills: 5 | Status: SHIPPED | OUTPATIENT
Start: 2021-12-07 | End: 2022-06-14

## 2022-02-07 DIAGNOSIS — F41.9 ANXIETY: ICD-10-CM

## 2022-02-08 RX ORDER — LORAZEPAM 1 MG/1
TABLET ORAL
Qty: 90 TABLET | Refills: 5 | Status: SHIPPED | OUTPATIENT
Start: 2022-02-08

## 2022-03-23 ENCOUNTER — TELEPHONE (OUTPATIENT)
Dept: FAMILY MEDICINE CLINIC | Facility: CLINIC | Age: 52
End: 2022-03-23

## 2022-03-23 DIAGNOSIS — I10 ESSENTIAL HYPERTENSION: Primary | ICD-10-CM

## 2022-03-23 DIAGNOSIS — Z12.5 SCREENING PSA (PROSTATE SPECIFIC ANTIGEN): ICD-10-CM

## 2022-03-23 NOTE — TELEPHONE ENCOUNTER
Confirmation Shukri Williamson [id number]  Effective: 03/23/2022     Expires: 06/21/2022  Active  Referred From  [address] [place] [address] Practice  Group QDP: [id number]  Provider OC: [id number]  Tax OT: [id number]  [address] [place] [address]  Referred To  Evonne Tolbert  Specialty: Not Available  Tier 1  Group PLT: [id number]  Provider HO: [id number]  Tax QP: [id number]  This referral is valid at any location for the above group    Patient Info  Zurdo Rosario  [id number]  Male  1970  [address] [place] [address] of Service  Office  Service Type  Medical Care  Diagnoses  4 U23 649 - pain in left hip  Procedures  None entered      Disclaimer  [facility] and [facility]ili[facility] ([facility]) will pay for only those services covered under the [place] [employer] which are specifically noted and requested by the PCP or OBGYN on the referral  If any additional services, testing, or follow-up care are required, the PCP or OBGYN must be contacted prior to the delivery of such additional services for written approval on a separate referral  Non-referred services will not be covered by [facility]  Benefits are underwritten or administered by [employer], a subsidiary of [employer], which are independent licensees of the [address] [place] [address] and [facility]

## 2022-03-25 LAB
ALBUMIN SERPL-MCNC: 4.4 G/DL (ref 3.8–4.9)
ALBUMIN/GLOB SERPL: 1.9 {RATIO} (ref 1.2–2.2)
ALP SERPL-CCNC: 72 IU/L (ref 44–121)
ALT SERPL-CCNC: 73 IU/L (ref 0–44)
AST SERPL-CCNC: 40 IU/L (ref 0–40)
BILIRUB SERPL-MCNC: <0.2 MG/DL (ref 0–1.2)
BUN SERPL-MCNC: 13 MG/DL (ref 6–24)
BUN/CREAT SERPL: 12 (ref 9–20)
CALCIUM SERPL-MCNC: 9.3 MG/DL (ref 8.7–10.2)
CHLORIDE SERPL-SCNC: 104 MMOL/L (ref 96–106)
CHOLEST SERPL-MCNC: 265 MG/DL (ref 100–199)
CO2 SERPL-SCNC: 20 MMOL/L (ref 20–29)
CREAT SERPL-MCNC: 1.07 MG/DL (ref 0.76–1.27)
EGFR: 83 ML/MIN/1.73
GLOBULIN SER-MCNC: 2.3 G/DL (ref 1.5–4.5)
GLUCOSE SERPL-MCNC: 104 MG/DL (ref 65–99)
HDLC SERPL-MCNC: 37 MG/DL
LDLC SERPL CALC-MCNC: 145 MG/DL (ref 0–99)
LDLC/HDLC SERPL: 3.9 RATIO (ref 0–3.6)
POTASSIUM SERPL-SCNC: 4.7 MMOL/L (ref 3.5–5.2)
PROT SERPL-MCNC: 6.7 G/DL (ref 6–8.5)
PSA SERPL-MCNC: 1.6 NG/ML (ref 0–4)
SL AMB REFLEX CRITERIA: NORMAL
SL AMB VLDL CHOLESTEROL CALC: 83 MG/DL (ref 5–40)
SODIUM SERPL-SCNC: 139 MMOL/L (ref 134–144)
TRIGL SERPL-MCNC: 445 MG/DL (ref 0–149)

## 2022-03-30 ENCOUNTER — RA CDI HCC (OUTPATIENT)
Dept: OTHER | Facility: HOSPITAL | Age: 52
End: 2022-03-30

## 2022-03-30 NOTE — PROGRESS NOTES
[facility]  coding opportunities       Chart reviewed, no opportunity found: CHART REVIEWED, NO OPPORTUNITY FOUND        Patients Insurance     Medicare Insurance: [employer] [facility] Advantage

## 2022-04-06 ENCOUNTER — OFFICE VISIT (OUTPATIENT)
Dept: FAMILY MEDICINE CLINIC | Facility: CLINIC | Age: 52
End: 2022-04-06
Payer: COMMERCIAL

## 2022-04-06 VITALS — OXYGEN SATURATION: 96 % | HEART RATE: 104 BPM | BODY MASS INDEX: 30.96 KG/M2 | WEIGHT: 174.8 LBS

## 2022-04-06 DIAGNOSIS — Z12.11 SCREENING FOR COLON CANCER: Primary | ICD-10-CM

## 2022-04-06 DIAGNOSIS — Z00.00 WELLNESS EXAMINATION: ICD-10-CM

## 2022-04-06 DIAGNOSIS — E78.2 MIXED HYPERLIPIDEMIA: ICD-10-CM

## 2022-04-06 DIAGNOSIS — F41.9 ANXIETY: ICD-10-CM

## 2022-04-06 DIAGNOSIS — N52.1 ERECTILE DISORDER DUE TO MEDICAL CONDITION IN MALE: ICD-10-CM

## 2022-04-06 DIAGNOSIS — I10 ESSENTIAL HYPERTENSION: ICD-10-CM

## 2022-04-06 DIAGNOSIS — F10.10 ALCOHOL ABUSE: ICD-10-CM

## 2022-04-06 PROCEDURE — 3725F SCREEN DEPRESSION PERFORMED: CPT | Performed by: FAMILY MEDICINE

## 2022-04-06 PROCEDURE — 99396 PREV VISIT EST AGE 40-64: CPT | Performed by: FAMILY MEDICINE

## 2022-04-06 PROCEDURE — 99214 OFFICE O/P EST MOD 30 MIN: CPT | Performed by: FAMILY MEDICINE

## 2022-04-06 RX ORDER — ROSUVASTATIN CALCIUM 5 MG/1
5 TABLET, COATED ORAL DAILY
Qty: 90 TABLET | Refills: 3 | Status: SHIPPED | OUTPATIENT
Start: 2022-04-06

## 2022-04-06 RX ORDER — SILDENAFIL 100 MG/1
100 TABLET, FILM COATED ORAL DAILY PRN
Qty: 30 TABLET | Refills: 2 | Status: SHIPPED | OUTPATIENT
Start: 2022-04-06

## 2022-04-06 NOTE — PROGRESS NOTES
HPI:  Andreas Almanzar is a 46 y o  male here for his yearly health maintenance exam    Patient Active Problem List   Diagnosis    Anxiety    Alcohol abuse    Mixed hyperlipidemia    Left hip pain    Arthritis of left hip    Essential hypertension    Chronic pain syndrome     Past Medical History:   Diagnosis Date    Anxiety     Arthritis     Back pain     Chronic pain disorder     Claustrophobia     Depression     Hypertension     Wears glasses        1  Advanced Directive: n     2  Durable Power of  for Healthcare: n     3  Social History:           Drug and alcohol History: y                  4  Immunizations up to date: y                 Lifestyle:                           Healthy Diet:y                          Alcohol Use:y                          Tobacco Use:n                          Regular exercise:y                          Weight concerns:y                               5  Over the past 2 weeks, how often have you been bothered by the following:              Little interest or pleasure in doing things:n              Felling down, depressed or hopeless:n       Current Outpatient Medications   Medication Sig Dispense Refill    buPROPion (WELLBUTRIN SR) 150 mg 12 hr tablet TAKE 1 TABLET BY MOUTH 2 TIMES A DAY 60 tablet 5    ibuprofen (MOTRIN) 800 mg tablet Take by mouth every 6 (six) hours as needed for mild pain      LORazepam (ATIVAN) 1 mg tablet TAKE 1 TABLET BY MOUTH EVERY 8 HOURS AS NEEDED FOR ANXIETY 90 tablet 5     No current facility-administered medications for this visit  No Known Allergies  Immunization History   Administered Date(s) Administered    COVID-19 PFIZER VACCINE 0 3 ML IM 05/02/2021, 05/28/2021    Influenza Quadrivalent Preservative Free 3 years and older IM 10/21/2015    Tdap 11/07/2011       Patient Care Team:  Chan Perez MD as PCP - General    Review of Systems   Constitutional: Negative for fatigue, fever and unexpected weight change     HENT: Negative for congestion, sinus pain and sore throat  Eyes: Negative for visual disturbance  Respiratory: Negative for shortness of breath and wheezing  Cardiovascular: Negative for chest pain and palpitations  Gastrointestinal: Negative for abdominal pain, nausea and vomiting  Musculoskeletal: Negative  Negative for arthralgias and myalgias  Neurological: Negative for syncope, weakness and numbness  Psychiatric/Behavioral: Negative  Negative for confusion, dysphoric mood and suicidal ideas  Physical Exam :  Physical Exam      Assessment and Plan:  1  Screening for colon cancer  Cologuard   2  Mixed hyperlipidemia     3  Alcohol abuse     4  Essential hypertension     5  Anxiety     6   Wellness examination         Health Maintenance Due   Topic Date Due    Hepatitis C Screening  Never done    Pneumococcal Vaccine: Pediatrics (0 to 5 Years) and At-Risk Patients (6 to 59 Years) (1 of 2 - PPSV23) Never done    Colorectal Cancer Screening  Never done    BMI: Followup Plan  03/03/2021    Influenza Vaccine (1) 09/01/2021    COVID-19 Vaccine (3 - Booster for Pfizer series) 10/28/2021    DTaP,Tdap,and Td Vaccines (2 - Td or Tdap) 11/07/2021    BMI: Adult  07/09/2022

## 2022-04-06 NOTE — PROGRESS NOTES
Assessment/Plan:    No problem-specific Assessment & Plan notes found for this encounter  Diagnoses and all orders for this visit:    Screening for colon cancer  -     Cologuard    Mixed hyperlipidemia    Alcohol abuse    Essential hypertension    Anxiety    Wellness examination          Subjective:   Chief Complaint   Patient presents with    Physical Exam     new Cologuard order placed        Patient ID: Minerva Cowan is a 46 y o  male  F/u labs--incr alcohol--trigs/chol/LFTs elev      The following portions of the patient's history were reviewed and updated as appropriate: allergies, current medications, past family history, past medical history, past social history, past surgical history and problem list     Review of Systems   Constitutional: Negative for fatigue, fever and unexpected weight change  HENT: Negative for congestion, sinus pain and sore throat  Eyes: Negative for visual disturbance  Respiratory: Negative for shortness of breath and wheezing  Cardiovascular: Negative for chest pain and palpitations  Gastrointestinal: Negative for abdominal pain, nausea and vomiting  Musculoskeletal: Negative  Negative for arthralgias and myalgias  Neurological: Negative for syncope, weakness and numbness  Psychiatric/Behavioral: Negative  Negative for confusion, dysphoric mood and suicidal ideas  Objective:  Vitals:    04/06/22 1535   Pulse: 104   SpO2: 96%   Weight: 79 3 kg (174 lb 12 8 oz)      Physical Exam  Constitutional:       Appearance: He is well-developed  HENT:      Right Ear: Ear canal normal  Tympanic membrane is not injected  Left Ear: Ear canal normal  Tympanic membrane is not injected  Nose: Nose normal    Eyes:      General:         Right eye: No discharge  Left eye: No discharge  Conjunctiva/sclera: Conjunctivae normal       Pupils: Pupils are equal, round, and reactive to light  Neck:      Thyroid: No thyromegaly     Cardiovascular: Rate and Rhythm: Normal rate and regular rhythm  Heart sounds: Normal heart sounds  No murmur heard  Pulmonary:      Effort: Pulmonary effort is normal  No respiratory distress  Breath sounds: Normal breath sounds  No wheezing  Abdominal:      General: Bowel sounds are normal  There is no distension  Palpations: Abdomen is soft  Tenderness: There is no abdominal tenderness  Musculoskeletal:         General: Normal range of motion  Cervical back: Normal range of motion and neck supple  Lymphadenopathy:      Cervical: No cervical adenopathy  Skin:     General: Skin is warm and dry  Neurological:      Mental Status: He is alert and oriented to person, place, and time  He is not disoriented  Sensory: No sensory deficit  Gait: Gait normal       Deep Tendon Reflexes: Reflexes are normal and symmetric  Psychiatric:         Speech: Speech normal          Behavior: Behavior normal          Thought Content:  Thought content normal          Judgment: Judgment normal

## 2022-04-11 ENCOUNTER — TELEPHONE (OUTPATIENT)
Dept: PSYCHIATRY | Facility: CLINIC | Age: 52
End: 2022-04-11

## 2022-04-20 ENCOUNTER — TELEPHONE (OUTPATIENT)
Dept: PSYCHIATRY | Facility: CLINIC | Age: 52
End: 2022-04-20

## 2022-05-11 LAB — COLOGUARD RESULT REPORTABLE: POSITIVE

## 2022-05-12 ENCOUNTER — TELEPHONE (OUTPATIENT)
Dept: FAMILY MEDICINE CLINIC | Facility: CLINIC | Age: 52
End: 2022-05-12

## 2022-06-12 DIAGNOSIS — F41.9 ANXIETY: ICD-10-CM

## 2022-06-14 RX ORDER — BUPROPION HYDROCHLORIDE 150 MG/1
TABLET, EXTENDED RELEASE ORAL
Qty: 60 TABLET | Refills: 5 | Status: SHIPPED | OUTPATIENT
Start: 2022-06-14

## 2022-07-29 DIAGNOSIS — K04.7 TOOTH ABSCESS: Primary | ICD-10-CM

## 2022-07-29 RX ORDER — AMOXICILLIN 500 MG/1
500 CAPSULE ORAL EVERY 8 HOURS SCHEDULED
Qty: 30 CAPSULE | Refills: 0 | Status: SHIPPED | OUTPATIENT
Start: 2022-07-29 | End: 2022-08-08

## 2022-08-11 DIAGNOSIS — F41.9 ANXIETY: ICD-10-CM

## 2022-08-12 ENCOUNTER — TELEPHONE (OUTPATIENT)
Dept: FAMILY MEDICINE CLINIC | Facility: CLINIC | Age: 52
End: 2022-08-12

## 2022-08-12 DIAGNOSIS — U07.1 COVID: Primary | ICD-10-CM

## 2022-08-12 RX ORDER — LORAZEPAM 1 MG/1
TABLET ORAL
Qty: 90 TABLET | Refills: 5 | Status: SHIPPED | OUTPATIENT
Start: 2022-08-12

## 2022-08-12 NOTE — TELEPHONE ENCOUNTER
Patient positive with Covid home test this morning    100 3 fever  Chest congestion  Nasal congestion  Chills    Daughter positive 08/11/2022    Asking for Aneesh CHOE to review

## 2022-12-13 LAB
ALBUMIN SERPL-MCNC: 4.4 G/DL (ref 3.8–4.9)
ALBUMIN/GLOB SERPL: 2.1 {RATIO} (ref 1.2–2.2)
ALP SERPL-CCNC: 72 IU/L (ref 44–121)
ALT SERPL-CCNC: 102 IU/L (ref 0–44)
AST SERPL-CCNC: 58 IU/L (ref 0–40)
BILIRUB SERPL-MCNC: 0.3 MG/DL (ref 0–1.2)
BUN SERPL-MCNC: 13 MG/DL (ref 6–24)
BUN/CREAT SERPL: 14 (ref 9–20)
CALCIUM SERPL-MCNC: 9 MG/DL (ref 8.7–10.2)
CHLORIDE SERPL-SCNC: 104 MMOL/L (ref 96–106)
CHOLEST SERPL-MCNC: 266 MG/DL (ref 100–199)
CO2 SERPL-SCNC: 20 MMOL/L (ref 20–29)
CREAT SERPL-MCNC: 0.96 MG/DL (ref 0.76–1.27)
EGFR: 95 ML/MIN/1.73
GLOBULIN SER-MCNC: 2.1 G/DL (ref 1.5–4.5)
GLUCOSE SERPL-MCNC: 93 MG/DL (ref 70–99)
HDLC SERPL-MCNC: 41 MG/DL
LDLC SERPL CALC-MCNC: 149 MG/DL (ref 0–99)
LDLC/HDLC SERPL: 3.6 RATIO (ref 0–3.6)
POTASSIUM SERPL-SCNC: 4.4 MMOL/L (ref 3.5–5.2)
PROT SERPL-MCNC: 6.5 G/DL (ref 6–8.5)
PSA SERPL-MCNC: 2.5 NG/ML (ref 0–4)
SL AMB REFLEX CRITERIA: NORMAL
SL AMB VLDL CHOLESTEROL CALC: 76 MG/DL (ref 5–40)
SODIUM SERPL-SCNC: 138 MMOL/L (ref 134–144)
TRIGL SERPL-MCNC: 405 MG/DL (ref 0–149)

## 2022-12-14 ENCOUNTER — OFFICE VISIT (OUTPATIENT)
Dept: FAMILY MEDICINE CLINIC | Facility: CLINIC | Age: 52
End: 2022-12-14

## 2022-12-14 VITALS
BODY MASS INDEX: 30.79 KG/M2 | SYSTOLIC BLOOD PRESSURE: 127 MMHG | DIASTOLIC BLOOD PRESSURE: 68 MMHG | HEIGHT: 63 IN | TEMPERATURE: 97.1 F | WEIGHT: 173.8 LBS | OXYGEN SATURATION: 99 % | HEART RATE: 68 BPM

## 2022-12-14 DIAGNOSIS — R26.89 BALANCE DISORDER: ICD-10-CM

## 2022-12-14 DIAGNOSIS — G44.031 INTRACTABLE EPISODIC PAROXYSMAL HEMICRANIA: ICD-10-CM

## 2022-12-14 DIAGNOSIS — R79.89 ELEVATED LFTS: Primary | ICD-10-CM

## 2022-12-14 DIAGNOSIS — F41.9 ANXIETY: ICD-10-CM

## 2022-12-14 RX ORDER — BUPROPION HYDROCHLORIDE 150 MG/1
150 TABLET, EXTENDED RELEASE ORAL 2 TIMES DAILY
Qty: 180 TABLET | Refills: 3 | Status: SHIPPED | OUTPATIENT
Start: 2022-12-14

## 2022-12-14 NOTE — PROGRESS NOTES
Assessment/Plan:    No problem-specific Assessment & Plan notes found for this encounter  Diagnoses and all orders for this visit:    Elevated LFTs  -     Hepatitis C Ab W/Refl To HCV RNA, Qn, PCR; Future  -     Ferritin; Future  -     Ferritin  -     Ferritin; Future  -     Hepatitis C antibody; Future  -     Ferritin  -     Hepatitis C antibody          Subjective:   Chief Complaint   Patient presents with   • Follow-up     3 months follow up  Right wrist pain/burn  Headaches frequently        Patient ID: Grier Mcburney is a 46 y o  male  HPI    The following portions of the patient's history were reviewed and updated as appropriate: allergies, current medications, past family history, past medical history, past social history, past surgical history and problem list     Review of Systems   Constitutional: Negative for fatigue, fever and unexpected weight change  HENT: Negative for congestion, sinus pain and sore throat  Eyes: Negative for visual disturbance  Respiratory: Negative for shortness of breath and wheezing  Cardiovascular: Negative for chest pain and palpitations  Gastrointestinal: Negative for abdominal pain, nausea and vomiting  Musculoskeletal: Negative  Negative for arthralgias and myalgias  Neurological: Negative for syncope, weakness and numbness  Psychiatric/Behavioral: Negative  Negative for confusion, dysphoric mood and suicidal ideas  Objective:  Vitals:    12/14/22 1443   BP: 127/68   BP Location: Left arm   Patient Position: Sitting   Cuff Size: Standard   Pulse: 68   Temp: (!) 97 1 °F (36 2 °C)   TempSrc: Tympanic   SpO2: 99%   Weight: 78 8 kg (173 lb 12 8 oz)   Height: 5' 3" (1 6 m)      Physical Exam  Constitutional:       Appearance: He is well-developed  HENT:      Right Ear: Ear canal normal  Tympanic membrane is not injected  Left Ear: Ear canal normal  Tympanic membrane is not injected        Nose: Nose normal    Eyes:      General: Right eye: No discharge  Left eye: No discharge  Conjunctiva/sclera: Conjunctivae normal       Pupils: Pupils are equal, round, and reactive to light  Neck:      Thyroid: No thyromegaly  Cardiovascular:      Rate and Rhythm: Normal rate and regular rhythm  Heart sounds: Normal heart sounds  No murmur heard  Pulmonary:      Effort: Pulmonary effort is normal  No respiratory distress  Breath sounds: Normal breath sounds  No wheezing  Abdominal:      General: Bowel sounds are normal  There is no distension  Palpations: Abdomen is soft  Tenderness: There is no abdominal tenderness  Musculoskeletal:         General: Normal range of motion  Cervical back: Normal range of motion and neck supple  Lymphadenopathy:      Cervical: No cervical adenopathy  Skin:     General: Skin is warm and dry  Neurological:      Mental Status: He is alert and oriented to person, place, and time  He is not disoriented  Sensory: No sensory deficit  Gait: Gait normal       Deep Tendon Reflexes: Reflexes are normal and symmetric  Psychiatric:         Speech: Speech normal          Behavior: Behavior normal          Thought Content: Thought content normal          Judgment: Judgment normal        Depression Screening Follow-up Plan: Patient's depression screening was positive with a PHQ-2 score of 0  Their PHQ-9 score was 13  Patient declines further evaluation by mental health professional and/or medications  They have no active suicidal ideations  Brief counseling provided and recommend additional follow-up/re-evaluation at next office visit

## 2023-01-01 DIAGNOSIS — K04.7 TOOTH ABSCESS: Primary | ICD-10-CM

## 2023-01-01 RX ORDER — AMOXICILLIN 500 MG/1
CAPSULE ORAL
Qty: 30 CAPSULE | Refills: 0 | Status: SHIPPED | OUTPATIENT
Start: 2023-01-01 | End: 2023-01-01

## 2023-01-12 ENCOUNTER — HOSPITAL ENCOUNTER (OUTPATIENT)
Dept: MRI IMAGING | Facility: HOSPITAL | Age: 53
Discharge: HOME | End: 2023-01-12

## 2023-01-12 DIAGNOSIS — R26.89 BALANCE DISORDER: ICD-10-CM

## 2023-01-13 ENCOUNTER — TELEPHONE (OUTPATIENT)
Dept: FAMILY MEDICINE CLINIC | Facility: CLINIC | Age: 53
End: 2023-01-13

## 2023-01-13 DIAGNOSIS — F41.9 ANXIETY: ICD-10-CM

## 2023-01-13 RX ORDER — LORAZEPAM 1 MG/1
1 TABLET ORAL EVERY 8 HOURS PRN
Qty: 90 TABLET | Refills: 5 | Status: SHIPPED | OUTPATIENT
Start: 2023-01-13

## 2023-01-13 NOTE — TELEPHONE ENCOUNTER
Patient called in requesting if Doyne Prader can prescribe him a sedative because he's getting a MRI and he's claustrophobic  Patient is requesting something to keep him relaxed through the process  [facility]ANT [phone]

## (undated) DEVICE — PENCIL ELECTROSURG E-Z CLEAN -0035H

## (undated) DEVICE — DRAPE ISO IRRIGATION POUCH 1016

## (undated) DEVICE — ADHESIVE SKIN CLOSR DERMABOND PRINEO

## (undated) DEVICE — 3M™ TEGADERM™ TRANSPARENT FILM DRESSING FRAME STYLE, 1628, 6 IN X 8 IN (15 CM X 20 CM), 10/CT 8CT/CASE: Brand: 3M™ TEGADERM™

## (undated) DEVICE — GLOVE SRG BIOGEL ORTHOPEDIC 7.5

## (undated) DEVICE — ELECTRODE BLADE MOD  E-Z CLEAN 6.5IN -0014M

## (undated) DEVICE — BLADE SAW 25MM WIDTH

## (undated) DEVICE — SUT STRATAFIX SPIRAL 1-0 PDO 36 X 36 CM SXPD2B405

## (undated) DEVICE — SURGICAL GOWN, XL SMARTSLEEVE: Brand: CONVERTORS

## (undated) DEVICE — THE SIMPULSE SOLO SYSTEM WITH ULTREX RETRACTABLE SPLASH SHIELD TIP: Brand: SIMPULSE SOLO

## (undated) DEVICE — COBAN 4 IN STERILE

## (undated) DEVICE — SUTURE VICRYL UNDYED 2-0 27IN CT-1 CR

## (undated) DEVICE — INTENDED FOR TISSUE SEPARATION, AND OTHER PROCEDURES THAT REQUIRE A SHARP SURGICAL BLADE TO PUNCTURE OR CUT.: Brand: BARD-PARKER SAFETY BLADES SIZE 10, STERILE

## (undated) DEVICE — PREP SURGICAL PURPREP 26ML

## (undated) DEVICE — TELFA NON-ADHERENT ABSORBENT DRESSING: Brand: TELFA

## (undated) DEVICE — 4-PORT MANIFOLD: Brand: NEPTUNE 2

## (undated) DEVICE — 3M™ IOBAN™ 2 ANTIMICROBIAL INCISE DRAPE 6650EZ: Brand: IOBAN™ 2

## (undated) DEVICE — PAD GROUNDING ADULT

## (undated) DEVICE — SUT ETHIBOND 5 V-37 30 IN MB66G

## (undated) DEVICE — DRAPE SHEET THREE QUARTER

## (undated) DEVICE — 3M™ DURAPORE™ SURGICAL TAPE 2 INCHES X 10YARDS (5.0CM X 9.1M) 6ROLLS/CARTON 10CARTONS/CASE 1538-2: Brand: 3M™ DURAPORE™

## (undated) DEVICE — PACK MAJOR ORTHO W/SPLITS PBDS

## (undated) DEVICE — GLOVE SRG BIOGEL PI ORTHOPEDIC 8

## (undated) DEVICE — SPONGE LAP 18 X 18 IN STRL RFD

## (undated) DEVICE — CAPIT HIP COP - ACTIS ONLY

## (undated) DEVICE — DRAPE C-ARM X-RAY

## (undated) DEVICE — SUT MONOCRYL 3-0 PS-2 27 IN Y427H

## (undated) DEVICE — COMFORT HOOD -75 EA/BX: Brand: CARDINAL HEALTH

## (undated) DEVICE — ARTHROSCOPY FLOOR MAT

## (undated) DEVICE — COBAN 6 IN STERILE

## (undated) DEVICE — 3M™ TRANSPORE™ WHITE SURGICAL TAPE 1534-2, 2 INCH X 10 YARD (5CM X 9,1M), 6 ROLLS/CARTON 10 CARTONS/CASE: Brand: 3M™ TRANSPORE™

## (undated) DEVICE — CAPIT HIP UPCHRG  GRIPTION CUP

## (undated) DEVICE — 3M™ STERI-DRAPE™ U-DRAPE 1015: Brand: STERI-DRAPE™